# Patient Record
Sex: FEMALE | Race: AMERICAN INDIAN OR ALASKA NATIVE | NOT HISPANIC OR LATINO | Employment: FULL TIME | URBAN - METROPOLITAN AREA
[De-identification: names, ages, dates, MRNs, and addresses within clinical notes are randomized per-mention and may not be internally consistent; named-entity substitution may affect disease eponyms.]

---

## 2021-11-29 PROBLEM — H81.10 BPPV (BENIGN PAROXYSMAL POSITIONAL VERTIGO): Status: ACTIVE | Noted: 2021-11-29

## 2023-03-22 PROBLEM — H91.92 HIGH-FREQUENCY HEARING LOSS OF LEFT EAR: Status: ACTIVE | Noted: 2023-03-22

## 2023-06-01 PROBLEM — H91.92 HIGH-FREQUENCY HEARING LOSS OF LEFT EAR: Status: RESOLVED | Noted: 2023-03-22 | Resolved: 2023-06-01

## 2023-06-01 PROBLEM — Z01.10 ENCOUNTER FOR EXAMINATION OF HEARING WITHOUT ABNORMAL FINDINGS: Status: ACTIVE | Noted: 2023-06-01

## 2023-07-31 PROBLEM — H90.5 SENSORINEURAL HEARING LOSS (SNHL) OF LEFT EAR: Status: ACTIVE | Noted: 2023-07-31

## 2024-08-27 PROBLEM — N94.89 ENDOMETRIAL MASS: Status: ACTIVE | Noted: 2024-08-27

## 2024-08-27 PROBLEM — N93.8 DUB (DYSFUNCTIONAL UTERINE BLEEDING): Status: ACTIVE | Noted: 2024-08-27

## 2024-09-10 ENCOUNTER — APPOINTMENT (OUTPATIENT)
Dept: RADIOLOGY | Facility: MEDICAL CENTER | Age: 49
DRG: 064 | End: 2024-09-10
Attending: STUDENT IN AN ORGANIZED HEALTH CARE EDUCATION/TRAINING PROGRAM
Payer: COMMERCIAL

## 2024-09-10 ENCOUNTER — HOSPITAL ENCOUNTER (INPATIENT)
Facility: MEDICAL CENTER | Age: 49
LOS: 4 days | DRG: 064 | End: 2024-09-14
Attending: STUDENT IN AN ORGANIZED HEALTH CARE EDUCATION/TRAINING PROGRAM | Admitting: STUDENT IN AN ORGANIZED HEALTH CARE EDUCATION/TRAINING PROGRAM
Payer: COMMERCIAL

## 2024-09-10 DIAGNOSIS — R74.01 TRANSAMINITIS: ICD-10-CM

## 2024-09-10 DIAGNOSIS — R51.9 ACUTE NONINTRACTABLE HEADACHE, UNSPECIFIED HEADACHE TYPE: ICD-10-CM

## 2024-09-10 DIAGNOSIS — G08 DURAL VENOUS SINUS THROMBOSIS: ICD-10-CM

## 2024-09-10 DIAGNOSIS — M54.50 LUMBAR PAIN: ICD-10-CM

## 2024-09-10 DIAGNOSIS — M54.2 CERVICALGIA: ICD-10-CM

## 2024-09-10 PROBLEM — E87.6 HYPOKALEMIA: Status: ACTIVE | Noted: 2024-09-10

## 2024-09-10 PROBLEM — R47.01 EXPRESSIVE APHASIA: Status: ACTIVE | Noted: 2024-09-10

## 2024-09-10 PROBLEM — R74.8 ABNORMAL TRANSAMINASES: Status: ACTIVE | Noted: 2024-09-10

## 2024-09-10 LAB
ALBUMIN SERPL BCP-MCNC: 3.7 G/DL (ref 3.2–4.9)
ALBUMIN/GLOB SERPL: 1.1 G/DL
ALP SERPL-CCNC: 95 U/L (ref 30–99)
ALT SERPL-CCNC: 498 U/L (ref 2–50)
ANION GAP SERPL CALC-SCNC: 16 MMOL/L (ref 7–16)
APTT PPP: 21.5 SEC (ref 24.7–36)
AST SERPL-CCNC: 323 U/L (ref 12–45)
BASOPHILS # BLD AUTO: 0.4 % (ref 0–1.8)
BASOPHILS # BLD: 0.04 K/UL (ref 0–0.12)
BILIRUB SERPL-MCNC: 0.9 MG/DL (ref 0.1–1.5)
BUN SERPL-MCNC: 6 MG/DL (ref 8–22)
CALCIUM ALBUM COR SERPL-MCNC: 8.8 MG/DL (ref 8.5–10.5)
CALCIUM SERPL-MCNC: 8.6 MG/DL (ref 8.5–10.5)
CHLORIDE SERPL-SCNC: 98 MMOL/L (ref 96–112)
CO2 SERPL-SCNC: 20 MMOL/L (ref 20–33)
CREAT SERPL-MCNC: 0.52 MG/DL (ref 0.5–1.4)
EOSINOPHIL # BLD AUTO: 0.02 K/UL (ref 0–0.51)
EOSINOPHIL NFR BLD: 0.2 % (ref 0–6.9)
ERYTHROCYTE [DISTWIDTH] IN BLOOD BY AUTOMATED COUNT: 44.2 FL (ref 35.9–50)
GFR SERPLBLD CREATININE-BSD FMLA CKD-EPI: 113 ML/MIN/1.73 M 2
GLOBULIN SER CALC-MCNC: 3.3 G/DL (ref 1.9–3.5)
GLUCOSE SERPL-MCNC: 95 MG/DL (ref 65–99)
HCT VFR BLD AUTO: 40 % (ref 37–47)
HGB BLD-MCNC: 13.6 G/DL (ref 12–16)
IMM GRANULOCYTES # BLD AUTO: 0.04 K/UL (ref 0–0.11)
IMM GRANULOCYTES NFR BLD AUTO: 0.4 % (ref 0–0.9)
INR PPP: 1.06 (ref 0.87–1.13)
LYMPHOCYTES # BLD AUTO: 2.07 K/UL (ref 1–4.8)
LYMPHOCYTES NFR BLD: 20.5 % (ref 22–41)
MCH RBC QN AUTO: 29.8 PG (ref 27–33)
MCHC RBC AUTO-ENTMCNC: 34 G/DL (ref 32.2–35.5)
MCV RBC AUTO: 87.7 FL (ref 81.4–97.8)
MONOCYTES # BLD AUTO: 0.89 K/UL (ref 0–0.85)
MONOCYTES NFR BLD AUTO: 8.8 % (ref 0–13.4)
NEUTROPHILS # BLD AUTO: 7.05 K/UL (ref 1.82–7.42)
NEUTROPHILS NFR BLD: 69.7 % (ref 44–72)
NRBC # BLD AUTO: 0 K/UL
NRBC BLD-RTO: 0 /100 WBC (ref 0–0.2)
PLATELET # BLD AUTO: 200 K/UL (ref 164–446)
PMV BLD AUTO: 10.1 FL (ref 9–12.9)
POTASSIUM SERPL-SCNC: 3.4 MMOL/L (ref 3.6–5.5)
PROT SERPL-MCNC: 7 G/DL (ref 6–8.2)
PROTHROMBIN TIME: 13.9 SEC (ref 12–14.6)
RBC # BLD AUTO: 4.56 M/UL (ref 4.2–5.4)
SODIUM SERPL-SCNC: 134 MMOL/L (ref 135–145)
WBC # BLD AUTO: 10.1 K/UL (ref 4.8–10.8)

## 2024-09-10 PROCEDURE — 770020 HCHG ROOM/CARE - TELE (206)

## 2024-09-10 PROCEDURE — 83735 ASSAY OF MAGNESIUM: CPT

## 2024-09-10 PROCEDURE — 85025 COMPLETE CBC W/AUTO DIFF WBC: CPT

## 2024-09-10 PROCEDURE — 99223 1ST HOSP IP/OBS HIGH 75: CPT | Performed by: STUDENT IN AN ORGANIZED HEALTH CARE EDUCATION/TRAINING PROGRAM

## 2024-09-10 PROCEDURE — 36415 COLL VENOUS BLD VENIPUNCTURE: CPT

## 2024-09-10 PROCEDURE — 85730 THROMBOPLASTIN TIME PARTIAL: CPT

## 2024-09-10 PROCEDURE — 85610 PROTHROMBIN TIME: CPT

## 2024-09-10 PROCEDURE — 99285 EMERGENCY DEPT VISIT HI MDM: CPT

## 2024-09-10 PROCEDURE — 80053 COMPREHEN METABOLIC PANEL: CPT

## 2024-09-10 RX ORDER — PROCHLORPERAZINE EDISYLATE 5 MG/ML
5-10 INJECTION INTRAMUSCULAR; INTRAVENOUS EVERY 4 HOURS PRN
Status: DISCONTINUED | OUTPATIENT
Start: 2024-09-10 | End: 2024-09-14 | Stop reason: HOSPADM

## 2024-09-10 RX ORDER — ONDANSETRON 4 MG/1
4 TABLET, ORALLY DISINTEGRATING ORAL EVERY 4 HOURS PRN
Status: DISCONTINUED | OUTPATIENT
Start: 2024-09-10 | End: 2024-09-14 | Stop reason: HOSPADM

## 2024-09-10 RX ORDER — POTASSIUM CHLORIDE 1500 MG/1
40 TABLET, EXTENDED RELEASE ORAL EVERY 4 HOURS
Status: COMPLETED | OUTPATIENT
Start: 2024-09-10 | End: 2024-09-11

## 2024-09-10 RX ORDER — PROMETHAZINE HYDROCHLORIDE 25 MG/1
12.5-25 TABLET ORAL EVERY 4 HOURS PRN
Status: DISCONTINUED | OUTPATIENT
Start: 2024-09-10 | End: 2024-09-14 | Stop reason: HOSPADM

## 2024-09-10 RX ORDER — ACETAMINOPHEN 500 MG
1000 TABLET ORAL EVERY 6 HOURS PRN
Status: DISCONTINUED | OUTPATIENT
Start: 2024-09-16 | End: 2024-09-14 | Stop reason: HOSPADM

## 2024-09-10 RX ORDER — OXYCODONE HYDROCHLORIDE 10 MG/1
10 TABLET ORAL
Status: DISCONTINUED | OUTPATIENT
Start: 2024-09-10 | End: 2024-09-14 | Stop reason: HOSPADM

## 2024-09-10 RX ORDER — PROMETHAZINE HYDROCHLORIDE 25 MG/1
12.5-25 SUPPOSITORY RECTAL EVERY 4 HOURS PRN
Status: DISCONTINUED | OUTPATIENT
Start: 2024-09-10 | End: 2024-09-14 | Stop reason: HOSPADM

## 2024-09-10 RX ORDER — ONDANSETRON 2 MG/ML
4 INJECTION INTRAMUSCULAR; INTRAVENOUS EVERY 4 HOURS PRN
Status: DISCONTINUED | OUTPATIENT
Start: 2024-09-10 | End: 2024-09-14 | Stop reason: HOSPADM

## 2024-09-10 RX ORDER — OXYCODONE HYDROCHLORIDE 5 MG/1
5 TABLET ORAL
Status: DISCONTINUED | OUTPATIENT
Start: 2024-09-10 | End: 2024-09-14 | Stop reason: HOSPADM

## 2024-09-10 RX ORDER — HYDROMORPHONE HYDROCHLORIDE 1 MG/ML
0.5 INJECTION, SOLUTION INTRAMUSCULAR; INTRAVENOUS; SUBCUTANEOUS
Status: DISCONTINUED | OUTPATIENT
Start: 2024-09-10 | End: 2024-09-14 | Stop reason: HOSPADM

## 2024-09-10 RX ORDER — ACETAMINOPHEN 500 MG
1000 TABLET ORAL EVERY 6 HOURS
Status: DISCONTINUED | OUTPATIENT
Start: 2024-09-11 | End: 2024-09-14 | Stop reason: HOSPADM

## 2024-09-10 RX ORDER — DEXAMETHASONE SODIUM PHOSPHATE 4 MG/ML
10 INJECTION, SOLUTION INTRA-ARTICULAR; INTRALESIONAL; INTRAMUSCULAR; INTRAVENOUS; SOFT TISSUE ONCE
Status: COMPLETED | OUTPATIENT
Start: 2024-09-10 | End: 2024-09-11

## 2024-09-10 ASSESSMENT — PAIN DESCRIPTION - PAIN TYPE: TYPE: ACUTE PAIN

## 2024-09-10 ASSESSMENT — FIBROSIS 4 INDEX: FIB4 SCORE: 4.07

## 2024-09-10 ASSESSMENT — ENCOUNTER SYMPTOMS
VOMITING: 1
HEADACHES: 1

## 2024-09-11 LAB
APTT PPP: 26.4 SEC (ref 24.7–36)
HCG SERPL QL: NEGATIVE
INR PPP: 1.13 (ref 0.87–1.13)
MAGNESIUM SERPL-MCNC: 1.9 MG/DL (ref 1.5–2.5)
PROTHROMBIN TIME: 14.6 SEC (ref 12–14.6)
UFH PPP CHRO-ACNC: 0.41 IU/ML
UFH PPP CHRO-ACNC: 0.49 IU/ML
UFH PPP CHRO-ACNC: 0.81 IU/ML
UFH PPP CHRO-ACNC: <0.1 IU/ML

## 2024-09-11 PROCEDURE — 85730 THROMBOPLASTIN TIME PARTIAL: CPT

## 2024-09-11 PROCEDURE — 99233 SBSQ HOSP IP/OBS HIGH 50: CPT | Performed by: NURSE PRACTITIONER

## 2024-09-11 PROCEDURE — 70551 MRI BRAIN STEM W/O DYE: CPT

## 2024-09-11 PROCEDURE — 85520 HEPARIN ASSAY: CPT | Mod: 91

## 2024-09-11 PROCEDURE — 85306 CLOT INHIBIT PROT S FREE: CPT

## 2024-09-11 PROCEDURE — 36415 COLL VENOUS BLD VENIPUNCTURE: CPT

## 2024-09-11 PROCEDURE — 85303 CLOT INHIBIT PROT C ACTIVITY: CPT

## 2024-09-11 PROCEDURE — 84703 CHORIONIC GONADOTROPIN ASSAY: CPT

## 2024-09-11 PROCEDURE — 85613 RUSSELL VIPER VENOM DILUTED: CPT | Mod: 91

## 2024-09-11 PROCEDURE — 96366 THER/PROPH/DIAG IV INF ADDON: CPT

## 2024-09-11 PROCEDURE — 70544 MR ANGIOGRAPHY HEAD W/O DYE: CPT

## 2024-09-11 PROCEDURE — 85301 ANTITHROMBIN III ANTIGEN: CPT

## 2024-09-11 PROCEDURE — 96375 TX/PRO/DX INJ NEW DRUG ADDON: CPT

## 2024-09-11 PROCEDURE — 99222 1ST HOSP IP/OBS MODERATE 55: CPT

## 2024-09-11 PROCEDURE — 86147 CARDIOLIPIN ANTIBODY EA IG: CPT | Mod: 91

## 2024-09-11 PROCEDURE — 85610 PROTHROMBIN TIME: CPT

## 2024-09-11 PROCEDURE — 700102 HCHG RX REV CODE 250 W/ 637 OVERRIDE(OP): Performed by: STUDENT IN AN ORGANIZED HEALTH CARE EDUCATION/TRAINING PROGRAM

## 2024-09-11 PROCEDURE — 770020 HCHG ROOM/CARE - TELE (206)

## 2024-09-11 PROCEDURE — 85300 ANTITHROMBIN III ACTIVITY: CPT

## 2024-09-11 PROCEDURE — 96365 THER/PROPH/DIAG IV INF INIT: CPT

## 2024-09-11 PROCEDURE — 86146 BETA-2 GLYCOPROTEIN ANTIBODY: CPT | Mod: 91

## 2024-09-11 PROCEDURE — 700111 HCHG RX REV CODE 636 W/ 250 OVERRIDE (IP): Performed by: STUDENT IN AN ORGANIZED HEALTH CARE EDUCATION/TRAINING PROGRAM

## 2024-09-11 PROCEDURE — A9270 NON-COVERED ITEM OR SERVICE: HCPCS | Performed by: STUDENT IN AN ORGANIZED HEALTH CARE EDUCATION/TRAINING PROGRAM

## 2024-09-11 RX ORDER — HEPARIN SODIUM 1000 [USP'U]/ML
40 INJECTION, SOLUTION INTRAVENOUS; SUBCUTANEOUS PRN
Status: DISCONTINUED | OUTPATIENT
Start: 2024-09-11 | End: 2024-09-14

## 2024-09-11 RX ORDER — ACETAMINOPHEN 500 MG
1000 TABLET ORAL EVERY 4 HOURS PRN
COMMUNITY

## 2024-09-11 RX ORDER — CYANOCOBALAMIN 1000 UG/ML
1000 INJECTION, SOLUTION INTRAMUSCULAR; SUBCUTANEOUS
COMMUNITY

## 2024-09-11 RX ORDER — HEPARIN SODIUM 1000 [USP'U]/ML
80 INJECTION, SOLUTION INTRAVENOUS; SUBCUTANEOUS ONCE
Status: COMPLETED | OUTPATIENT
Start: 2024-09-11 | End: 2024-09-11

## 2024-09-11 RX ORDER — HEPARIN SODIUM 5000 [USP'U]/100ML
0-30 INJECTION, SOLUTION INTRAVENOUS CONTINUOUS
Status: DISCONTINUED | OUTPATIENT
Start: 2024-09-11 | End: 2024-09-14

## 2024-09-11 RX ADMIN — ACETAMINOPHEN 1000 MG: 500 TABLET ORAL at 05:12

## 2024-09-11 RX ADMIN — ACETAMINOPHEN 1000 MG: 500 TABLET ORAL at 19:44

## 2024-09-11 RX ADMIN — HEPARIN SODIUM 18 UNITS/KG/HR: 5000 INJECTION, SOLUTION INTRAVENOUS at 01:40

## 2024-09-11 RX ADMIN — DEXAMETHASONE SODIUM PHOSPHATE 10 MG: 4 INJECTION INTRA-ARTICULAR; INTRALESIONAL; INTRAMUSCULAR; INTRAVENOUS; SOFT TISSUE at 00:40

## 2024-09-11 RX ADMIN — ACETAMINOPHEN 1000 MG: 500 TABLET ORAL at 12:44

## 2024-09-11 RX ADMIN — HEPARIN SODIUM 4600 UNITS: 1000 INJECTION, SOLUTION INTRAVENOUS; SUBCUTANEOUS at 01:37

## 2024-09-11 RX ADMIN — ACETAMINOPHEN 1000 MG: 500 TABLET ORAL at 00:39

## 2024-09-11 RX ADMIN — POTASSIUM CHLORIDE 40 MEQ: 1500 TABLET, EXTENDED RELEASE ORAL at 00:39

## 2024-09-11 RX ADMIN — POTASSIUM CHLORIDE 40 MEQ: 1500 TABLET, EXTENDED RELEASE ORAL at 04:33

## 2024-09-11 SDOH — ECONOMIC STABILITY: TRANSPORTATION INSECURITY
IN THE PAST 12 MONTHS, HAS LACK OF RELIABLE TRANSPORTATION KEPT YOU FROM MEDICAL APPOINTMENTS, MEETINGS, WORK OR FROM GETTING THINGS NEEDED FOR DAILY LIVING?: NO

## 2024-09-11 SDOH — ECONOMIC STABILITY: TRANSPORTATION INSECURITY
IN THE PAST 12 MONTHS, HAS THE LACK OF TRANSPORTATION KEPT YOU FROM MEDICAL APPOINTMENTS OR FROM GETTING MEDICATIONS?: NO

## 2024-09-11 ASSESSMENT — PATIENT HEALTH QUESTIONNAIRE - PHQ9
4. FEELING TIRED OR HAVING LITTLE ENERGY: SEVERAL DAYS
1. LITTLE INTEREST OR PLEASURE IN DOING THINGS: NOT AT ALL
7. TROUBLE CONCENTRATING ON THINGS, SUCH AS READING THE NEWSPAPER OR WATCHING TELEVISION: NOT AT ALL
8. MOVING OR SPEAKING SO SLOWLY THAT OTHER PEOPLE COULD HAVE NOTICED. OR THE OPPOSITE, BEING SO FIGETY OR RESTLESS THAT YOU HAVE BEEN MOVING AROUND A LOT MORE THAN USUAL: NOT AT ALL
SUM OF ALL RESPONSES TO PHQ9 QUESTIONS 1 AND 2: 1
5. POOR APPETITE OR OVEREATING: SEVERAL DAYS
9. THOUGHTS THAT YOU WOULD BE BETTER OFF DEAD, OR OF HURTING YOURSELF: NOT AT ALL
2. FEELING DOWN, DEPRESSED, IRRITABLE, OR HOPELESS: SEVERAL DAYS
3. TROUBLE FALLING OR STAYING ASLEEP OR SLEEPING TOO MUCH: SEVERAL DAYS
SUM OF ALL RESPONSES TO PHQ QUESTIONS 1-9: 4
6. FEELING BAD ABOUT YOURSELF - OR THAT YOU ARE A FAILURE OR HAVE LET YOURSELF OR YOUR FAMILY DOWN: NOT AL ALL

## 2024-09-11 ASSESSMENT — COGNITIVE AND FUNCTIONAL STATUS - GENERAL
SUGGESTED CMS G CODE MODIFIER MOBILITY: CH
DAILY ACTIVITIY SCORE: 24
SUGGESTED CMS G CODE MODIFIER DAILY ACTIVITY: CH
MOBILITY SCORE: 24

## 2024-09-11 ASSESSMENT — LIFESTYLE VARIABLES
HAVE PEOPLE ANNOYED YOU BY CRITICIZING YOUR DRINKING: NO
TOTAL SCORE: 0
ALCOHOL_USE: NO
HOW MANY TIMES IN THE PAST YEAR HAVE YOU HAD 5 OR MORE DRINKS IN A DAY: 0
TOTAL SCORE: 0
ON A TYPICAL DAY WHEN YOU DRINK ALCOHOL HOW MANY DRINKS DO YOU HAVE: 0
DOES PATIENT WANT TO STOP DRINKING: NO
HAVE YOU EVER FELT YOU SHOULD CUT DOWN ON YOUR DRINKING: NO
TOTAL SCORE: 0
EVER HAD A DRINK FIRST THING IN THE MORNING TO STEADY YOUR NERVES TO GET RID OF A HANGOVER: NO
EVER FELT BAD OR GUILTY ABOUT YOUR DRINKING: NO
CONSUMPTION TOTAL: NEGATIVE
AVERAGE NUMBER OF DAYS PER WEEK YOU HAVE A DRINK CONTAINING ALCOHOL: 0

## 2024-09-11 ASSESSMENT — SOCIAL DETERMINANTS OF HEALTH (SDOH)

## 2024-09-11 ASSESSMENT — ENCOUNTER SYMPTOMS
WEAKNESS: 1
GASTROINTESTINAL NEGATIVE: 1
DIZZINESS: 1
HEADACHES: 1
MYALGIAS: 1
RESPIRATORY NEGATIVE: 1
PSYCHIATRIC NEGATIVE: 1
CHILLS: 0
FEVER: 0
EYES NEGATIVE: 1
CARDIOVASCULAR NEGATIVE: 1

## 2024-09-11 ASSESSMENT — FIBROSIS 4 INDEX: FIB4 SCORE: 3.62

## 2024-09-11 ASSESSMENT — PAIN DESCRIPTION - PAIN TYPE
TYPE: ACUTE PAIN

## 2024-09-11 NOTE — ED PROVIDER NOTES
ED Provider Note    CHIEF COMPLAINT  Chief Complaint   Patient presents with    Headache    Slurred Speech       EXTERNAL RECORDS REVIEWED  Reviewed imaging from outside facility.  Concerning for possible dural venous sinus thrombosis.  No large vessel infarct or hemorrhage.    HPI/ROS  LIMITATION TO HISTORY   None  OUTSIDE HISTORIAN(S):  Discussed with Dr. Turner, provider at [facility] Stefanie Marcelo is a 48 y.o. female who presents for evaluation of possible venous sinus thrombosis.  Patient has had headaches for months.  She was seen on 4 September for vomiting, neck pain and headache where she had a normal CT head and C-spine.  She presented again today for a constant, mild nonradiating headache.  Today she had developed some speech difficulties she said that she had difficulty forming words and felt that it was more slurred.  She is able to comprehend words and answer yes/no questions without difficulty.  She denies any new visual changes, no diplopia or double vision.  No neck pain.  No fevers no head trauma.    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    SURGICAL HISTORY   has a past surgical history that includes tubal coagulation laparoscopic bilateral (1999).    FAMILY HISTORY  History reviewed. No pertinent family history.    SOCIAL HISTORY  Social History     Tobacco Use    Smoking status: Former    Smokeless tobacco: Never   Vaping Use    Vaping status: Never Used   Substance and Sexual Activity    Alcohol use: No    Drug use: No    Sexual activity: Yes     Partners: Male     Birth control/protection: Female Sterilization       CURRENT MEDICATIONS  Home Medications    **Home medications have not yet been reviewed for this encounter**       Audit from Redirected Encounters    **Home medications have not yet  "been reviewed for this encounter**         ALLERGIES  Allergies   Allergen Reactions    Amoxicillin Swelling     Swelling lips, blisters in mouth, hives    Apple Hives     Oral swelling, hives    Aspirin Swelling     TOLD NOT TO TAKE DUE TO NSAID ALLERGY    Eggplant Hives     Blisters in throat    Ibuprofen Hives and Swelling    Shrimp (Diagnostic) Hives and Swelling    Tomato Hives and Swelling    [place] Hives and Swelling     Per pt, she can eat other tree nuts like almonds and also tolerates peanuts    Milk-Related Compounds      GI symptoms       PHYSICAL EXAM  VITAL SIGNS: /54   Pulse (!) 57   Temp 36.8 °C (98.3 °F) (Oral)   Resp 18   Ht 1.549 m (5' 1\")   Wt 73 kg (161 lb)   LMP 08/12/2024 (Exact Date) Comment: tubal ligation 1999  SpO2 96%   BMI 30.42 kg/m²    Constitutional: Awake and alert . Non toxic  HENT: Normal inspection    Eyes: Normal inspection  Neck: Grossly normal range of motion.  Cardiovascular: Normal heart rate, Normal rhythm.  Symmetric peripheral pulses.   Thorax & Lungs: No respiratory distress, No wheezing, No rales, No rhonchi  Abdomen: Soft, non-distended, nontender to palpation in all 4 quadrants, no mass  Skin: No obvious rash.  Extremities: Warm, well perfused. No clubbing, cyanosis, edema   Neurologic: She is A&Ox 4.  She has no facial droop.  She has no pronator drift.  He has full strength to upper and lower extremities.  Grossly normal sensation to light touch.  She does have difficulty forming words with expressive aphasia.  Grossly normal coordination.  Psychiatric: Normal for situation      EKG/LABS  Results for orders placed or performed during the hospital encounter of 09/10/24   CBC WITH DIFFERENTIAL   Result Value Ref Range    WBC 10.1 4.8 - 10.8 K/uL    RBC 4.56 4.20 - 5.40 M/uL    Hemoglobin 13.6 12.0 - 16.0 g/dL    Hematocrit 40.0 37.0 - 47.0 %    MCV 87.7 81.4 - 97.8 fL    MCH 29.8 27.0 - 33.0 pg    MCHC 34.0 32.2 - 35.5 g/dL    RDW 44.2 35.9 - 50.0 fL    " Platelet Count 200 164 - 446 K/uL    MPV 10.1 9.0 - 12.9 fL    Neutrophils-Polys 69.70 44.00 - 72.00 %    Lymphocytes 20.50 (L) 22.00 - 41.00 %    Monocytes 8.80 0.00 - 13.40 %    Eosinophils 0.20 0.00 - 6.90 %    Basophils 0.40 0.00 - 1.80 %    Immature Granulocytes 0.40 0.00 - 0.90 %    Nucleated RBC 0.00 0.00 - 0.20 /100 WBC    Neutrophils (Absolute) 7.05 1.82 - 7.42 K/uL    Lymphs (Absolute) 2.07 1.00 - 4.80 K/uL    Monos (Absolute) 0.89 (H) 0.00 - 0.85 K/uL    Eos (Absolute) 0.02 0.00 - 0.51 K/uL    Baso (Absolute) 0.04 0.00 - 0.12 K/uL    Immature Granulocytes (abs) 0.04 0.00 - 0.11 K/uL    NRBC (Absolute) 0.00 K/uL   COMP METABOLIC PANEL   Result Value Ref Range    Sodium 134 (L) 135 - 145 mmol/L    Potassium 3.4 (L) 3.6 - 5.5 mmol/L    Chloride 98 96 - 112 mmol/L    Co2 20 20 - 33 mmol/L    Anion Gap 16.0 7.0 - 16.0    Glucose 95 65 - 99 mg/dL    Bun 6 (L) 8 - 22 mg/dL    Creatinine 0.52 0.50 - 1.40 mg/dL    Calcium 8.6 8.5 - 10.5 mg/dL    Correct Calcium 8.8 8.5 - 10.5 mg/dL    AST(SGOT) 323 (H) 12 - 45 U/L    ALT(SGPT) 498 (H) 2 - 50 U/L    Alkaline Phosphatase 95 30 - 99 U/L    Total Bilirubin 0.9 0.1 - 1.5 mg/dL    Albumin 3.7 3.2 - 4.9 g/dL    Total Protein 7.0 6.0 - 8.2 g/dL    Globulin 3.3 1.9 - 3.5 g/dL    A-G Ratio 1.1 g/dL   APTT   Result Value Ref Range    APTT 21.5 (L) 24.7 - 36.0 sec   PROTHROMBIN TIME (INR)   Result Value Ref Range    PT 13.9 12.0 - 14.6 sec    INR 1.06 0.87 - 1.13   ESTIMATED GFR   Result Value Ref Range    GFR (CKD-EPI) 113 >60 mL/min/1.73 m 2         RADIOLOGY/PROCEDURES     Radiologist interpretation:  MR-VENOGRAM (MRV) HEAD    (Results Pending)   MR-BRAIN-W/O    (Results Pending)       COURSE & MEDICAL DECISION MAKING    ASSESSMENT, COURSE AND PLAN  Care Narrative: This is a 48-year-old female who was transferred due to concern for venous sinus thrombosis.  She has had chronic headaches but today had an episode of word finding difficulty.  On arrival here she has normal  vital signs.  She does have some word finding difficulty but otherwise no focal deficits. NIHSS 2.  Patient already had cross-sectional imaging with concern for venous sinus thrombosis but no findings of an acute CVA or hemorrhage.  I have ordered an MRV for further characterization.  After discussion with the previous ER doctor and his conversation with neurology decision made to hold on heparinization until MRI and therefore patient has not been anticoagulated.  Of note she does have a transaminitis of unclear significance otherwise her labs are unremarkable.  Discussed with Dr. Louis who will admit for further management.  Neurology consultation will be obtained as an inpatient, as no acute CVA no indication for emergent consultation at this time.            DISPOSITION AND DISCUSSIONS  I have discussed management of the patient with the following physicians and RORO's:  Dr. Louis      FINAL DIAGNOSIS  1. Acute nonintractable headache, unspecified headache type Acute   2. Transaminitis Acute        Electronically signed by: Dionne Delvalle M.D., 9/10/2024 10:27 PM

## 2024-09-11 NOTE — CARE PLAN
The patient is Stable - Low risk of patient condition declining or worsening    Shift Goals  Clinical Goals: stable/improved neuro intact  Patient Goals: ambulation  Family Goals: updates    Progress made toward(s) clinical / shift goals:    Problem: Knowledge Deficit - Standard  Goal: Patient and family/care givers will demonstrate understanding of plan of care, disease process/condition, diagnostic tests and medications  Outcome: Progressing     Problem: Psychosocial  Goal: Patient's level of anxiety will decrease  Outcome: Progressing  Goal: Patient's ability to verbalize feelings about condition will improve  Outcome: Progressing     Problem: Communication  Goal: The ability to communicate needs accurately and effectively will improve  Outcome: Progressing     Problem: Discharge Barriers/Planning  Goal: Patient's continuum of care needs are met  Outcome: Progressing     Problem: Hemodynamics  Goal: Patient's hemodynamics, fluid balance and neurologic status will be stable or improve  Outcome: Progressing     Problem: Dysphagia  Goal: Dysphagia will improve  Outcome: Progressing     Problem: Nutrition  Goal: Patient's nutritional and fluid intake will be adequate or improve  Outcome: Progressing     Problem: Mobility  Goal: Patient's capacity to carry out activities will improve  Outcome: Progressing     Problem: Infection - Standard  Goal: Patient will remain free from infection  Outcome: Progressing       Patient is not progressing towards the following goals:

## 2024-09-11 NOTE — ASSESSMENT & PLAN NOTE
CT at outside facility showed no stroke with possible cavernous sinus thrombosis.   MRI confirms dural sinus thrombosis  Continue heparin drip  Neurochecks every 4 hours  Neurology Dr. Monreal following  STAT CT head if patient shows acute neurologic deterioration or obtundation

## 2024-09-11 NOTE — PROGRESS NOTES
4 Eyes Skin Assessment Completed by BRIGIDO Piper and BRIGIDO Richards.    Head WDL  Ears WDL  Nose WDL  Mouth WDL  Neck WDL  Breast/Chest WDL  Shoulder Blades WDL  Spine WDL  (R) Arm/Elbow/Hand WDL  (L) Arm/Elbow/Hand WDL  Abdomen WDL  Groin WDL  Scrotum/Coccyx/Buttocks WDL  (R) Leg WDL  (L) Leg WDL  (R) Heel/Foot/Toe WDL  (L) Heel/Foot/Toe WDL          Devices In Places Tele Box, Blood Pressure Cuff, Pulse Ox, and SCD's      Interventions In Place Pillows, Heels Loaded W/Pillows, and Pressure Redistribution Mattress    Possible Skin Injury No    Pictures Uploaded Into Epic N/A  Wound Consult Placed N/A  RN Wound Prevention Protocol Ordered No

## 2024-09-11 NOTE — ED NOTES
Pt reports she is feeling better and that is becoming easier & easier to speak. Pt speech clear, and able to recall words quickly. Reports she still feels slightly slow compared to baseline.

## 2024-09-11 NOTE — ED NOTES
Bedside report received from off going RN/tech: BRIGIDO Swenson assumed care of patient.  POC discussed with patient. Call light within reach, all needs addressed at this time.       Fall risk interventions in place: Give patient urinal if applicable and Keep floor surfaces clean and dry (all applicable per [place] Fall risk assessment)   Continuous monitoring: Cardiac Leads, Pulse Ox, or Blood Pressure  IVF/IV medications: Not Applicable   Oxygen: Room Air  Bedside sitter: Not Applicable   Isolation: Not Applicable

## 2024-09-11 NOTE — ASSESSMENT & PLAN NOTE
CT at outside facility showed no stroke with possible cavernous sinus thrombosis.   Resolved  MRI shows venous sinus thrombosis, no acute stroke  Neurology following  See recommendations

## 2024-09-11 NOTE — PROGRESS NOTES
Hospital Medicine Daily Progress Note    Date of Service  9/11/2024    Chief Complaint  Rama Marcelo is a 48 y.o. female admitted 9/10/2024 with severe and worsening headaches    Hospital Course  Ms. Rama Marcelo is a 48 y.o. female who presented 9/10/2024 as a transfer from outside facility with concerns of cavernous sinus thrombosis.      Patient reports having headache progressively worsening over the last 2 weeks.  Reports the headache started in the front of her head behind her eyes and traveled to the back of her head down her neck and front of her neck.  Reports associated vomiting.  Reports today this morning she had difficulty finding her words.  Denies any fevers or chills.  CT imaging at outside facility showed no stroke but possible cavernous venous thrombosis.  Neuro on board and recommended MRV imaging and confirming diagnosis before initiating anticoagulation.      During this hospitalization, neurology Dr. Monreal was consulted.  Notable MRV revealed extensive sinus venous thrombosis of the right jugular vein, right sigmoid, transverse and superior sagittal sinus.  Patient initiated on hypercoagulable panel on heparin drip and will continue for 3-4 days prior to transitioning to oral DOAC and apixaban.  MRI brain demonstrate left parietal venous infarct with petechial hemorrhage however anticoagulation remains treatment for further development of venous infarcts and hemorrhage.  Patient will be monitored inpatient while on heparin drip and will be discharged to follow-up with outpatient stroke [facility] and repeat MRI in approximately 6 months.    Interval Problem Update  -Patient seen and examined.  Patient reports headache is still lingering, however it has significantly improved.  Discussed with patient recommendations from neurology of which she is agreeable at this time.  -Plan of care: Continue heparin drip 3-4 days prior to transitioning to oral DOAC and apixaban; monitor  headache and treat as indicated; monitor for any signs of bleed and do neurochecks as indicated.  -Disposition: Patient currently patient status as she is anticipated to stay 2-3 midnights for management of venous sinus thrombosis.  -Lab work: Reviewed; expected  -VSS at this time    I have discussed this patient's plan of care and discharge plan at IDT rounds today with Case Management, Nursing, Nursing leadership, and other members of the IDT team.    Consultants/Specialty  neurology    Code Status  Full Code    Disposition  The patient is not medically cleared for discharge to home or a post-acute facility.  Anticipate discharge to: home with close outpatient follow-up    I have placed the appropriate orders for post-discharge needs.    Review of Systems  Review of Systems   Constitutional:  Positive for malaise/fatigue. Negative for chills and fever.   HENT: Negative.     Eyes: Negative.    Respiratory: Negative.     Cardiovascular: Negative.    Gastrointestinal: Negative.    Genitourinary: Negative.    Musculoskeletal:  Positive for myalgias.   Skin: Negative.    Neurological:  Positive for dizziness, weakness and headaches.   Endo/Heme/Allergies: Negative.    Psychiatric/Behavioral: Negative.          Physical Exam  Temp:  [36.5 °C (97.7 °F)-36.9 °C (98.4 °F)] 36.5 °C (97.7 °F)  Pulse:  [51-88] 64  Resp:  [16-21] 18  BP: (107-156)/(54-89) 122/70  SpO2:  [92 %-98 %] 97 %    Physical Exam  Vitals and nursing note reviewed.   Constitutional:       Appearance: She is obese.   HENT:      Head: Normocephalic.      Nose: Nose normal.      Mouth/Throat:      Mouth: Mucous membranes are moist.      Pharynx: Oropharynx is clear.   Eyes:      Pupils: Pupils are equal, round, and reactive to light.   Cardiovascular:      Rate and Rhythm: Normal rate and regular rhythm.      Pulses: Normal pulses.      Heart sounds: Normal heart sounds.   Pulmonary:      Effort: Pulmonary effort is normal.      Breath sounds: Normal breath  sounds.   Abdominal:      General: Bowel sounds are normal.   Musculoskeletal:         General: Tenderness present.      Cervical back: Normal range of motion and neck supple.   Skin:     General: Skin is dry.   Neurological:      Mental Status: She is alert. Mental status is at baseline.      Motor: Weakness present.         Fluids  No intake or output data in the 24 hours ending 09/11/24 1210     Laboratory  Recent Labs     09/10/24  1900 09/10/24  2201   WBC 9.1 10.1   RBC 4.94 4.56   HEMOGLOBIN 14.7 13.6   HEMATOCRIT 42.5 40.0   MCV 86.0 87.7   MCH 29.8 29.8   MCHC 34.6 34.0   RDW 13.6 44.2   PLATELETCT 225 200   MPV 10.2 10.1     Recent Labs     09/10/24  1900 09/10/24  2201   SODIUM 134* 134*   POTASSIUM 3.2* 3.4*   CHLORIDE 104 98   CO2 21* 20   GLUCOSE 112* 95   BUN 7 6*   CREATININE 0.6 0.52   CALCIUM 9.2 8.6     Recent Labs     09/10/24  1900 09/10/24  2201 09/11/24  0123   APTT 25.6 21.5* 26.4   INR 0.99 1.06 1.13               Imaging  MR-BRAIN-W/O   Final Result      1.  Dural sinus thrombosis involving the right internal jugular vein, sigmoid sinus, transverse sinus and sagittal sinus.   2.  Thrombosis of a left posterior cerebral vein overlying the parietal cortex with associated venous infarct.      Attempts to convey these findings to Dr. Louis were initiated on 9/11/2024 12:16 AM. Findings were conveyed to Dr. Louis on 9/11/2024 12:26 AM.      MR-VENOGRAM (MRV) HEAD   Final Result         1.  Occlusive dural venous sinus thrombosis involving the sagittal sinus, right transverse and sigmoid sinuses and proximal right jugular vein. Additionally, there appears to be thrombosis of a left posterior superior cerebral vein overlying the parietal    lobe.   2.  Left transverse and sigmoid sinuses and left jugular vein appear patent.      Attempts to convey these findings to Dr. Louis were initiated on 9/11/2024 12:16 AM. Findings were conveyed to Dr. Louis on 9/11/2024 12:26 AM.            Assessment/Plan  * Acute intractable headache  Assessment & Plan  CT at outside facility showed no stroke with possible cavernous sinus thrombosis.  MRV ordered.  Supportive care with pain control until MRV confirms diagnosis of venous sinus thrombosis.  Will initiate therapeutic anticoagulation if diagnosis made on MRV and confirmed.  Neurochecks every 4 hours  Neurology Dr. Monreal following  Start patient on heparin drip for 3-4 days and will transition to oral DOAC and apixaban  Monitor for any signs of bleed    Hypokalemia  Assessment & Plan  Replete   Check magnesium     Abnormal transaminases  Assessment & Plan  Chronic       Expressive aphasia- (present on admission)  Assessment & Plan  CT at outside facility showed no stroke with possible cavernous sinus thrombosis.  MRV ordered.  Supportive care with pain control until MRV confirms diagnosis of venous sinus thrombosis.  Will initiate therapeutic anticoagulation if diagnosis made on MRV and confirmed.  Neurochecks every 4 hours  Neurology following  See recommendations         VTE prophylaxis:    therapeutic anticoagulation with Heparin gtt      I have performed a physical exam and reviewed and updated ROS and Plan today (9/11/2024). In review of yesterday's note (9/10/2024), there are no changes except as documented above.      Please note that this dictation was created using voice recognition software. I have made every reasonable attempt to correct obvious errors, but there may be errors of grammar and possibly content that I did not discover before finalizing the note.    Electronically signed by:  Dr. DARON Slater, DNP, APRN, FNP-C  Hospitalist Services  [facility]  [phone]  [email or web address]  09/11/24                 0046

## 2024-09-11 NOTE — ED TRIAGE NOTES
"Chief Complaint   Patient presents with    Headache    Slurred Speech     Pt arrives with complaint of headache x 2 weeks and expressive aphasia since 0800. Pt was seen at [place] where she was diagnosed with cavernus sinus thrombosis. Pt sent for neurology consult and MRI. Pt arrives aox4, skin warm and dry, airway patent, rr even and unlabored, moves all extremities upon command. NIH 2 due to mild aphasia and dysarthria.     /67   Pulse 60   Temp 36.8 °C (98.3 °F) (Oral)   Resp 18   Ht 1.549 m (5' 1\")   Wt 73 kg (161 lb)   LMP 08/12/2024 (Exact Date) Comment: tubal ligation 1999  SpO2 97%   BMI 30.42 kg/m²     "

## 2024-09-11 NOTE — ED NOTES
Bedside report received from off going RN: Digna, assumed care of patient.  POC discussed with patient. Call light within reach, all needs addressed at this time. Pt just ambulated back from restroom. Pt eating meal tray.       Fall risk interventions in place: Patient's personal possessions are with in their safe reach and Keep floor surfaces clean and dry (all applicable per [employer] Fall risk assessment)   Continuous monitoring: Cardiac Leads, Pulse Ox, or Blood Pressure  IVF/IV medications: Infusion per MAR (List Med(s)) Heparin 18 units/kg/hr.   Oxygen: Room Air  Bedside sitter: n/a  Isolation: Not Applicable

## 2024-09-11 NOTE — PROGRESS NOTES
Stroke neuro brief note    48F no significant medical history, on OCP, presenting with headache and slurred speech, transferred from OSH for further care.  On arrival, MRI brain, MRI venogram revealing extensive sinus venous thrombosis of the R jugular vein, R sigmoid, transverse, and superior sagittal sinus.  In addition, there is cortical vein thrombosis of a L parietal vein with associated venous infarct.  There is no hemorrhage.    Recommendations:   - will draw hypercoagulable workup now:  Factor II, Factor V Leiden. ATIII assay, antiphospholipid panel   - start heparin bolus protocol for therapeutic anticoagulation   - formal stroke consult in AM    Sergio Monreal MD  Stroke Neurology

## 2024-09-11 NOTE — CONSULTS
Vascular Neurology Initial Consult H&P  Neurovascular Service, [facility]    Referring Physician: Lucille Wilson*    Chief Complaint   Patient presents with    Headache    Slurred Speech       HPI: Rama Marcelo is a 48 y.o. female without significant PMHX, on OCP, presenting to OSH with 2 weeks H.A and 1 day slurred speech. NCCTH at OSH suggestive of L cortical venous thrombosis and posterior superior sagittal sinus. On arrival to [facility] MRI brain, MRV revealed extensive sinus venous thrombosis of the R jugular vein, R sigmoid, transverse, and superior sagittal sinus. Additionally, there is cortical vein thrombosis of a L parietal vein with associated venous infarct. Neurology consulted for evaluation of above.         Patient seen in the ER, confirms she has had 2 weeks of H.A with insidious onset, not positional. Reports she had associated N&V. Yesterday morning, she developed acute onset speech difficulties. Currently H.A 3/10, no nausea. Speech has returned to baseline. Denies any history of blood clots, family hemophilia disorders. Reports she has had 2 miscarriages in the past. Discussed needing to stop OCP, reports she was planned for possible hysterectomy October 3 for fibroids.           Review of systems: In addition to what is detailed in the HPI above, all other systems reviewed and are negative.    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    She has no past medical history of Acute nasopharyngitis, Addisons disease (HCC), Adrenal disorder (HCC), Allergy, Anemia, Anesthesia, Anginal syndrome (HCC), Anxiety, Arrhythmia, Arthritis, Asthma, At risk for sleep apnea, Blood clotting disorder ([facility]), Bowel habit changes, Breath shortness, Bronchitis, Cancer ([facility]), Carcinoma in  situ of respiratory system, Cataract, CHF (congestive heart failure) (HCC), Clotting disorder (HCC), Continuous ambulatory peritoneal dialysis status (HCC), COPD (chronic obstructive pulmonary disease) (HCC), Coughing blood, Cushings syndrome (HCC), Dental disorder, Depression, Diabetes (HCC), Diabetic neuropathy (HCC), Dialysis patient (HCC), GERD (gastroesophageal reflux disease), Glaucoma, Goiter, Heart attack (HCC), Heart burn, Heart murmur, Heart valve disease, Hemorrhagic disorder (HCC), Hepatitis A, Hepatitis B, Hepatitis C, Hiatus hernia syndrome, High cholesterol, HIV (human immunodeficiency virus infection) (HCC), Hyperlipidemia, Hypertension, IBD (inflammatory bowel disease), Indigestion, Infectious disease, Jaundice, Kidney disease, Meningitis, Migraine, Muscle disorder, Osteoporosis, Pacemaker, Parathyroid disorder (HCC), Pituitary disease (HCC), Pneumonia, Pregnant, Psychiatric problem, Pulmonary emphysema (HCC), Rheumatic fever, Seizure (HCC), Sickle cell disease (HCC), Sleep apnea, Snoring, Stroke ([facility]), Substance abuse ([facility]), Thyroid disease, Tuberculosis, Urinary bladder disorder, or Urinary tract infection.    FHx:  family history is not on file.    SHx:   reports that she has quit smoking. She has never used smokeless tobacco. She reports that she does not drink alcohol and does not use drugs.    Allergies:  Allergies   Allergen Reactions    Amoxicillin Swelling     Swelling lips, blisters in mouth, hives    Apple Hives     Oral swelling, hives    Aspirin Swelling     TOLD NOT TO TAKE DUE TO NSAID ALLERGY    Eggplant Hives     Blisters in throat    Ibuprofen Hives and Swelling    Shrimp (Diagnostic) Hives and Swelling    Tomato Hives and Swelling    [place] Hives and Swelling     Per pt, she can eat other tree nuts like almonds and also tolerates peanuts    Milk-Related Compounds      GI symptoms       Medications:    Current Facility-Administered Medications:     heparin infusion 25,000 units in  500 mL 0.45% NACL, 0-30 Units/kg/hr (Adjusted), Intravenous, Continuous, Johnson Louis M.D., Last Rate: 20.8 mL/hr at 09/11/24 0442, 18 Units/kg/hr at 09/11/24 0442    heparin injection 2,300 Units, 40 Units/kg (Adjusted), Intravenous, PRN, Johnson Louis M.D.    ondansetron (Zofran) syringe/vial injection 4 mg, 4 mg, Intravenous, Q4HRS PRN, Johnson Louis M.D.    ondansetron (Zofran ODT) dispertab 4 mg, 4 mg, Oral, Q4HRS PRN, Johnson Louis M.D.    promethazine (Phenergan) tablet 12.5-25 mg, 12.5-25 mg, Oral, Q4HRS PRN, Johnson Louis M.D.    promethazine (Phenergan) suppository 12.5-25 mg, 12.5-25 mg, Rectal, Q4HRS PRN, Johnson Louis M.D.    prochlorperazine (Compazine) injection 5-10 mg, 5-10 mg, Intravenous, Q4HRS PRN, Johnson Louis M.D.    Pharmacy Consult Request ...Pain Management Review 1 Each, 1 Each, Other, PHARMACY TO DOSE, Johnson Louis M.D.    acetaminophen (Tylenol) tablet 1,000 mg, 1,000 mg, Oral, Q6HRS, 1,000 mg at 09/11/24 0512 **FOLLOWED BY** [START ON 9/16/2024] acetaminophen (Tylenol) tablet 1,000 mg, 1,000 mg, Oral, Q6HRS PRN, Johnson Loius M.D.    oxyCODONE immediate-release (Roxicodone) tablet 5 mg, 5 mg, Oral, Q3HRS PRN **OR** oxyCODONE immediate release (Roxicodone) tablet 10 mg, 10 mg, Oral, Q3HRS PRN **OR** HYDROmorphone (Dilaudid) injection 0.5 mg, 0.5 mg, Intravenous, Q3HRS PRN, Johnson Louis M.D.    Current Outpatient Medications:     cyanocobalamin (VITAMIN B-12) 1000 MCG/ML Solution, Inject 1,000 mcg into the shoulder, thigh, or buttocks every 30 days. [facility], Disp: , Rfl:     acetaminophen (TYLENOL) 500 MG Tab, Take 1,000 mg by mouth every four hours as needed. Indications: Pain, Disp: , Rfl:     Ascorbic Acid (VITAMIN C PO), Take 1 Tablet by mouth every Monday, Wednesday, and Friday., Disp: , Rfl:     VITAMINS A C PO, Take 1 Tablet by mouth every day., Disp: , Rfl:     Cholecalciferol (VITAMIN D3) 2000 UNIT Cap, Take 1 Capsule by mouth every  "day., Disp: , Rfl:     ferrous gluconate (FERGON) 324 (38 Fe) MG Tab, Take 648 mg by mouth every Monday, Wednesday, and Friday., Disp: , Rfl:     Norethin Ace-Eth Estrad-FE (MICROGESTIN 24 FE) 1-20 MG-MCG(24) Tab, Take 1 Tablet by mouth every day., Disp: 84 Tablet, Rfl: 4    Prenatal 27-1 MG Tab, Take 1 Tablet by mouth every day., Disp: , Rfl:     ondansetron (ZOFRAN ODT) 4 MG TABLET DISPERSIBLE, Take 1 Tablet by mouth every 6 hours as needed for Nausea/Vomiting. (Patient not taking: Reported on 9/11/2024), Disp: 10 Tablet, Rfl: 0    Physical Examination:    /75   Pulse (!) 58   Temp 36.8 °C (98.3 °F) (Oral)   Resp 18   Ht 1.549 m (5' 1\")   Wt 73 kg (161 lb)   LMP 08/12/2024 (Exact Date) Comment: tubal ligation 1999  SpO2 95%   BMI 30.42 kg/m²       General: Patient is awake and in no acute distress  Eye: Examination of optic disks not indicated at this time given acuity of consult  Neck: There is normal range of motion  CV: regular rate   Extremities:  clear, dry, intact, without peripheral edema    NEUROLOGICAL EXAM:   Mental status: Awake, alert and fully oriented  Speech and language: Naming and repetition intact, fluent speech, follows simple, two-step, multi-step and complex commands.  CRANIAL NERVES:  II: Pupils equal and reactive, no VF deficits  III, IV, VI: EOM intact, no gaze preference or deviation, no nystagmus.  V: normal sensation in V1, V2, and V3 segments bilaterally  VII: no asymmetry, no nasolabial fold flattening  VIII: normal hearing to conversation  IX, X: normal palatal elevation, no uvular deviation  XI: 5/5 head turn and 5/5 shoulder shrug bilaterally  XII: midline tongue protrusion      Motor exam: There is sustained antigravity with no downward drift in bilateral arms and legs.  There is no pronator drift. Tone is normal. No abnormal movements were seen on exam.    RUE 5/5   LUE 5/5   RLL 5/5   LLL 5/5        Sensory exam: Reacts to tactile in all 4 extremities, no neglect to " double stim   Deep tendon reflexes:  2+ throughout. Toes down-going bilaterally.  Coordination: No ataxia on bilateral finger-to-nose testing  Gait: Deferred         NIHSS: National Institutes of Health Stroke Scale    [0] 1a:Level of Consciousness    0-alert 1-drowsy   2-stupor   3-coma  [0] 1b:LOC Questions                  0-both  1-one      2-neither  [0] 1c:LOC Commands                   0-both  1-one      2-neither  [0] 2: Best Gaze                     0-nl    1-partial  2-forced  [0] 3: Visual Fields                   0-nl    1-partial  2-complete 3-bilat  [0] 4: Facial Paresis                0-nl    1-minor    2-partial  3-full  MOTOR                       0-nl  [0] 5: Right Arm           1-drift  [0] 6: Left Arm             2-some effort vs gravity  [0] 7: Right Leg           3-no effort vs gravity  [0] 8: Left Leg             4-no movement                             x-untestable  [0] 9: Limb Ataxia                    0-abs   1-1_limb   2-2+_limbs       x-untestable  [0] 10:Sensory                        0-nl    1-partial  2-dense  [0] 11:Best Language/Aphasia         0-nl    1-mild/mod 2-severe   3-mute  [0] 12:Dysarthria                     0-nl    1-mild/mod 2-severe       x-untestable  [0] 13:Neglect/Inattention            0-none  1-partial  2-complete  [0] TOTAL        Baseline Modified Boyle Scale (MRS): 0 = No symptoms    Objective Data:    Labs:  Estimated Creatinine Clearance: 118.3 mL/min (by C-G formula based on SCr of 0.52 mg/dL).  Recent Labs     09/10/24  1900 09/10/24  2201   SODIUM 134* 134*   POTASSIUM 3.2* 3.4*   CHLORIDE 104 98   CO2 21* 20   GLUCOSE 112* 95   BUN 7 6*   CREATININE 0.6 0.52     Recent Labs     09/10/24  1900 09/10/24  2201   GLUCOSE 112* 95     Recent Labs     09/10/24  1900 09/10/24  2201   ASTSGOT 443* 323*   ALTSGPT 584* 498*   ALKPHOSPHAT 108 95     Recent Labs     09/10/24  1900 09/10/24  2201   WBC 9.1 10.1   HEMOGLOBIN 14.7 13.6   PLATELETCT 225 200     Lab  "Results   Component Value Date/Time    PROTHROMBTM 14.6 09/11/2024 01:23 AM    INR 1.13 09/11/2024 01:23 AM      No results found for: \"TROPONINT\", \"NTPROBNP\"  No results found for: \"HBA1C\"  No results found for: \"LDL\", \"HDL\", \"TRIGLYCERIDE\", \"CHOLSTRLTOT\"    Imaging/Testing:    I interpreted and/or reviewed the patient's neuroimaging    MR-BRAIN-W/O   Final Result      1.  Dural sinus thrombosis involving the right internal jugular vein, sigmoid sinus, transverse sinus and sagittal sinus.   2.  Thrombosis of a left posterior cerebral vein overlying the parietal cortex with associated venous infarct.      Attempts to convey these findings to Dr. Louis were initiated on 9/11/2024 12:16 AM. Findings were conveyed to Dr. Louis on 9/11/2024 12:26 AM.      MR-VENOGRAM (MRV) HEAD   Final Result         1.  Occlusive dural venous sinus thrombosis involving the sagittal sinus, right transverse and sigmoid sinuses and proximal right jugular vein. Additionally, there appears to be thrombosis of a left posterior superior cerebral vein overlying the parietal    lobe.   2.  Left transverse and sigmoid sinuses and left jugular vein appear patent.      Attempts to convey these findings to Dr. Louis were initiated on 9/11/2024 12:16 AM. Findings were conveyed to Dr. Louis on 9/11/2024 12:26 AM.          Assessment and Plan:    Rama Marcelo is a 48 y.o. female transferred from OSH for evaluation of sinus thromboses. MRI brain and MRV on arrival revealed extensive sinus venous thrombosis of the R jugular vein, R sigmoid, transverse, and superior sagittal sinus. In addition, there is cortical vein thrombosis of a L parietal vein with associated venous infarct. Small petechial hemorrhage in stroke bed. Started on Heparin gtt overnight, hypercoagulable panel pending. Discussed imaging results with patient at bedside, and need to discontinue OCP and all hormonal meds.         Problem list:  - Sinus venous thrombosis   - L " Parietal infarct       Plan:  - Neurochecks Q4H  - Target normotension 100-130/60-80  - Continue Heparin gtt, given clot burden will need to continue on gtt for 3-4 days    - Will need DOAC for 6 months   - Hypercoagulable panel pending   - DC all hormonal medications including OCP  - H.A management per primary team   - Target normoglycemia  while admitted  - PT/OT/SLP  - DVT prophylaxis: Heparin gtt   - Will need follow up MRI brain wwo in 5-6 months   - Follow-up with stroke clinic outpatient        CATHY Ballard  Vascular Neurology, Acute Care Services       The evaluation of the patient, and recommended management, was discussed with Dr. Monreal, attending Vascular Neurologist.          Please note that this dictation was created using voice recognition software.  I have made every reasonable attempt to correct obvious errors, but I expect that there are errors of grammar and possibly content that I did not discover before finalizing the note.

## 2024-09-11 NOTE — HOSPITAL COURSE
Ms. Rama Marcelo is a 48 y.o. female who presented 9/10/2024 as a transfer from outside facility with concerns of cavernous sinus thrombosis.      Patient reports having headache progressively worsening over the last 2 weeks.  Reports the headache started in the front of her head behind her eyes and traveled to the back of her head down her neck and front of her neck.  Reports associated vomiting.  Reports today this morning she had difficulty finding her words.  Denies any fevers or chills.  CT imaging at outside facility showed no stroke but possible cavernous venous thrombosis.  Neuro on board and recommended MRV imaging and confirming diagnosis before initiating anticoagulation.      During this hospitalization, neurology Dr. Monreal was consulted.  Notable MRV revealed extensive sinus venous thrombosis of the right jugular vein, right sigmoid, transverse and superior sagittal sinus.  Patient initiated on hypercoagulable panel on heparin drip and will continue for 3-4 days prior to transitioning to oral DOAC and apixaban.  MRI brain demonstrate left parietal venous infarct with petechial hemorrhage however anticoagulation remains treatment for further development of venous infarcts and hemorrhage.  Patient will be monitored inpatient while on heparin drip and will be discharged to follow-up with outpatient stroke Bridge clinic and repeat MRI in approximately 6 months.

## 2024-09-11 NOTE — ED NOTES
Pt sleeping, airway patent, rr even and unlabored. no new complaints or distress noted at this time. Rails up times two, call light within reach.

## 2024-09-11 NOTE — ED NOTES
Med rec is complete per Patient at bedside.     Allergies reviewed.    Has patient had any outside antibiotics in the last 30 days? N    Any Anticoagulants (rivaroxaban, apixaban, edoxaban, dabigatran, warfarin, enoxaparin) taken in the last 14 days? N           Pharmacy/Pharmacies Pt utilizes : [facility]

## 2024-09-11 NOTE — H&P
Hospital Medicine History & Physical Note    Date of Service  9/10/2024    Primary Care Physician  [facility]    Consultants  None     Code Status  Full Code    Chief Complaint  Chief Complaint   Patient presents with    Headache    Slurred Speech       History of Presenting Illness  Rama Marcelo is a 48 y.o. female who presented 9/10/2024 as a transfer from outside facility with concerns of cavernous sinus thrombosis.  Patient reports having headache progressively worsening over the last 2 weeks.  Reports the headache started in the front of her head behind her eyes and traveled to the back of her head down her neck and front of her neck.  Reports associated vomiting.  Reports today this morning she had difficulty finding her words.  Denies any fevers or chills.  CT imaging at outside facility showed no stroke but possible cavernous venous thrombosis.  Neuro on board and recommended MRV imaging and confirming diagnosis before initiating anticoagulation.      I discussed the plan of care with patient.    Review of Systems  Review of Systems   Gastrointestinal:  Positive for vomiting.   Neurological:  Positive for headaches.       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    Surgical History   has a past surgical history that includes tubal coagulation laparoscopic bilateral (1999).     Family History  family history is not on file.   Family history reviewed with patient. There is no family history that is pertinent to the chief complaint.     Social History   reports that she has quit smoking. She has never used smokeless tobacco. She reports that she does not drink alcohol and does not use drugs.    Allergies  Allergies   Allergen Reactions    Amoxicillin Swelling     Swelling lips, blisters in mouth, hives    Apple Hives     Oral swelling, hives     Aspirin Swelling     TOLD NOT TO TAKE DUE TO NSAID ALLERGY    Eggplant Hives     Blisters in throat    Ibuprofen Hives and Swelling    Shrimp (Diagnostic) Hives and Swelling    Tomato Hives and Swelling    [place] Hives and Swelling     Per pt, she can eat other tree nuts like almonds and also tolerates peanuts    Milk-Related Compounds      GI symptoms       Medications  Prior to Admission Medications   Prescriptions Last Dose Informant Patient Reported? Taking?   Cholecalciferol (VITAMIN D3) 2000 UNIT Cap   Yes No   Sig: Take 1 Capsule by mouth every day.   Cyanocobalamin (VITAMIN B-12 INJ)   Yes No   Sig: Inject  as directed.   Norethin Ace-Eth Estrad-FE (MICROGESTIN 24 FE) 1-20 MG-MCG(24) Tab   No No   Sig: Take 1 Tablet by mouth every day.   Prenatal 27-1 MG Tab   Yes No   diphenhydrAMINE (BENADRYL) 25 MG Tab   Yes No   Sig: Take 25 mg by mouth every 6 hours as needed for Sleep or Itching.   ferrous gluconate (FERGON) 324 (38 Fe) MG Tab   Yes No   Sig: TAKE 2 TABLETS BY MOUTH 30 MINS PRIOR TO BREAKFAST ON MON/WED/FRI WITH VITAMIN C & A LARGE GLASS OF WATER   ondansetron (ZOFRAN ODT) 4 MG TABLET DISPERSIBLE   No No   Sig: Take 1 Tablet by mouth every 6 hours as needed for Nausea/Vomiting.      Facility-Administered Medications: None       Physical Exam  Temp:  [36.8 °C (98.3 °F)-36.9 °C (98.4 °F)] 36.8 °C (98.3 °F)  Pulse:  [58-86] 60  Resp:  [18-21] 18  BP: (116-156)/(58-89) 136/67  SpO2:  [94 %-97 %] 97 %  Blood Pressure: 136/67   Temperature: 36.8 °C (98.3 °F)   Pulse: 60   Respiration: 18   Pulse Oximetry: 97 %       Physical Exam  Vitals and nursing note reviewed.   Constitutional:       Appearance: Normal appearance.   HENT:      Head: Normocephalic and atraumatic.      Right Ear: Tympanic membrane normal.      Left Ear: Tympanic membrane normal.      Nose: Nose normal.      Mouth/Throat:      Mouth: Mucous membranes are moist.      Pharynx: Oropharynx is clear.   Eyes:      Extraocular Movements:  "Extraocular movements intact.      Pupils: Pupils are equal, round, and reactive to light.   Cardiovascular:      Rate and Rhythm: Normal rate and regular rhythm.      Pulses: Normal pulses.      Heart sounds: Normal heart sounds.   Pulmonary:      Effort: Pulmonary effort is normal.      Breath sounds: Normal breath sounds.   Abdominal:      General: Bowel sounds are normal. There is no distension.      Palpations: Abdomen is soft. There is no mass.   Musculoskeletal:         General: Normal range of motion.      Cervical back: Neck supple.   Skin:     General: Skin is warm.      Capillary Refill: Capillary refill takes less than 2 seconds.   Neurological:      Mental Status: She is alert. Mental status is at baseline.   Psychiatric:         Mood and Affect: Mood normal.         Behavior: Behavior normal.         Laboratory:  Recent Labs     09/10/24  1900 09/10/24  2201   WBC 9.1 10.1   RBC 4.94 4.56   HEMOGLOBIN 14.7 13.6   HEMATOCRIT 42.5 40.0   MCV 86.0 87.7   MCH 29.8 29.8   MCHC 34.6 34.0   RDW 13.6 44.2   PLATELETCT 225 200   MPV 10.2 10.1     Recent Labs     09/10/24  1900 09/10/24  2201   SODIUM 134* 134*   POTASSIUM 3.2* 3.4*   CHLORIDE 104 98   CO2 21* 20   GLUCOSE 112* 95   BUN 7 6*   CREATININE 0.6 0.52   CALCIUM 9.2 8.6     Recent Labs     09/10/24  1900 09/10/24  2201   ALTSGPT 584* 498*   ASTSGOT 443* 323*   ALKPHOSPHAT 108 95   TBILIRUBIN 1.3 0.9   GLUCOSE 112* 95     Recent Labs     09/10/24  1900 09/10/24  2201   APTT 25.6 21.5*   INR 0.99 1.06     No results for input(s): \"NTPROBNP\" in the last 72 hours.      No results for input(s): \"TROPONINT\" in the last 72 hours.    Imaging:  MR-VENOGRAM (MRV) HEAD    (Results Pending)   MR-BRAIN-W/O    (Results Pending)       no X-Ray or EKG requiring interpretation    Assessment/Plan:  Justification for Admission Status  I anticipate this patient is appropriate for observation status at this time because acute intractable headache requiring MRV imaging to " confirm cavernous sinus thrombosis.    Patient will need a Telemetry bed on NEUROLOGY service .  The need is secondary to see above.    * Acute intractable headache  Assessment & Plan  CT at outside facility showed no stroke with possible cavernous sinus thrombosis.  MRV ordered.  Supportive care with pain control until MRV confirms diagnosis of venous sinus thrombosis.  Will initiate therapeutic anticoagulation if diagnosis made on MRV and confirmed.  Neurochecks every 4 hours    Hypokalemia  Assessment & Plan  Replete   Check magnesium     Abnormal transaminases  Assessment & Plan  Chronic       Expressive aphasia- (present on admission)  Assessment & Plan  CT at outside facility showed no stroke with possible cavernous sinus thrombosis.  MRV ordered.  Supportive care with pain control until MRV confirms diagnosis of venous sinus thrombosis.  Will initiate therapeutic anticoagulation if diagnosis made on MRV and confirmed.  Neurochecks every 4 hours        VTE prophylaxis: SCDs/TEDs

## 2024-09-12 PROBLEM — G08 DURAL VENOUS SINUS THROMBOSIS: Status: ACTIVE | Noted: 2024-09-10

## 2024-09-12 LAB
ANION GAP SERPL CALC-SCNC: 12 MMOL/L (ref 7–16)
AT III ACT/NOR PPP CHRO: 93 % (ref 76–128)
AT III AG ACT/NOR PPP IA: 76 % (ref 82–136)
B2 GLYCOPROT1 IGG SERPL IA-ACNC: <10 SGU
B2 GLYCOPROT1 IGM SERPL IA-ACNC: <10 SMU
BUN SERPL-MCNC: 6 MG/DL (ref 8–22)
CALCIUM SERPL-MCNC: 9.1 MG/DL (ref 8.5–10.5)
CARDIOLIPIN IGA SER IA-ACNC: <10 APL
CARDIOLIPIN IGG SER IA-ACNC: <10 GPL
CARDIOLIPIN IGG SER IA-ACNC: <10 GPL
CARDIOLIPIN IGM SER IA-ACNC: <10 MPL
CARDIOLIPIN IGM SER IA-ACNC: <10 MPL
CHLORIDE SERPL-SCNC: 104 MMOL/L (ref 96–112)
CO2 SERPL-SCNC: 21 MMOL/L (ref 20–33)
CREAT SERPL-MCNC: 0.52 MG/DL (ref 0.5–1.4)
ERYTHROCYTE [DISTWIDTH] IN BLOOD BY AUTOMATED COUNT: 44 FL (ref 35.9–50)
GFR SERPLBLD CREATININE-BSD FMLA CKD-EPI: 112 ML/MIN/1.73 M 2
GLUCOSE SERPL-MCNC: 104 MG/DL (ref 65–99)
HCT VFR BLD AUTO: 42.3 % (ref 37–47)
HGB BLD-MCNC: 14.3 G/DL (ref 12–16)
MCH RBC QN AUTO: 29.7 PG (ref 27–33)
MCHC RBC AUTO-ENTMCNC: 33.8 G/DL (ref 32.2–35.5)
MCV RBC AUTO: 87.8 FL (ref 81.4–97.8)
PLATELET # BLD AUTO: 269 K/UL (ref 164–446)
PMV BLD AUTO: 10.7 FL (ref 9–12.9)
POTASSIUM SERPL-SCNC: 3.8 MMOL/L (ref 3.6–5.5)
RBC # BLD AUTO: 4.82 M/UL (ref 4.2–5.4)
SODIUM SERPL-SCNC: 137 MMOL/L (ref 135–145)
UFH PPP CHRO-ACNC: 0.47 IU/ML
WBC # BLD AUTO: 11.1 K/UL (ref 4.8–10.8)

## 2024-09-12 PROCEDURE — 85520 HEPARIN ASSAY: CPT

## 2024-09-12 PROCEDURE — 99291 CRITICAL CARE FIRST HOUR: CPT | Performed by: STUDENT IN AN ORGANIZED HEALTH CARE EDUCATION/TRAINING PROGRAM

## 2024-09-12 PROCEDURE — 99232 SBSQ HOSP IP/OBS MODERATE 35: CPT

## 2024-09-12 PROCEDURE — 770020 HCHG ROOM/CARE - TELE (206)

## 2024-09-12 PROCEDURE — 700102 HCHG RX REV CODE 250 W/ 637 OVERRIDE(OP): Performed by: STUDENT IN AN ORGANIZED HEALTH CARE EDUCATION/TRAINING PROGRAM

## 2024-09-12 PROCEDURE — A9270 NON-COVERED ITEM OR SERVICE: HCPCS | Performed by: STUDENT IN AN ORGANIZED HEALTH CARE EDUCATION/TRAINING PROGRAM

## 2024-09-12 PROCEDURE — 36415 COLL VENOUS BLD VENIPUNCTURE: CPT

## 2024-09-12 PROCEDURE — 80048 BASIC METABOLIC PNL TOTAL CA: CPT

## 2024-09-12 PROCEDURE — 700111 HCHG RX REV CODE 636 W/ 250 OVERRIDE (IP): Performed by: STUDENT IN AN ORGANIZED HEALTH CARE EDUCATION/TRAINING PROGRAM

## 2024-09-12 PROCEDURE — 85027 COMPLETE CBC AUTOMATED: CPT

## 2024-09-12 RX ADMIN — HEPARIN SODIUM 16 UNITS/KG/HR: 5000 INJECTION, SOLUTION INTRAVENOUS at 02:51

## 2024-09-12 RX ADMIN — ACETAMINOPHEN 1000 MG: 500 TABLET ORAL at 02:49

## 2024-09-12 RX ADMIN — ACETAMINOPHEN 1000 MG: 500 TABLET ORAL at 13:25

## 2024-09-12 ASSESSMENT — ENCOUNTER SYMPTOMS
GASTROINTESTINAL NEGATIVE: 1
EYES NEGATIVE: 1
CHILLS: 0
DIZZINESS: 1
FEVER: 0
PSYCHIATRIC NEGATIVE: 1
HEADACHES: 1
RESPIRATORY NEGATIVE: 1
CARDIOVASCULAR NEGATIVE: 1
WEAKNESS: 1
MYALGIAS: 1

## 2024-09-12 ASSESSMENT — PAIN DESCRIPTION - PAIN TYPE: TYPE: ACUTE PAIN

## 2024-09-12 ASSESSMENT — FIBROSIS 4 INDEX: FIB4 SCORE: 2.69

## 2024-09-12 NOTE — PROGRESS NOTES
Vascular Neurology Progress Note  Vascular Neurology Service, [facility]    Referring Physician: Jey Hernandez M.D.      Interval History: No acute events overnight. Reports H.A 4/10 today, localizes to R posterior head.     Review of systems: In addition to what is detailed in the HPI and/or updated in the interval history, all other systems reviewed and are negative.    Past Medical History, Past Surgical History and Social History reviewed and unchanged from prior    Medications:    Current Facility-Administered Medications:     heparin infusion 25,000 units in 500 mL 0.45% NACL, 0-30 Units/kg/hr (Adjusted), Intravenous, Continuous, Johnson Louis M.D., Last Rate: 18.5 mL/hr at 09/12/24 0705, 16 Units/kg/hr at 09/12/24 0705    heparin injection 2,300 Units, 40 Units/kg (Adjusted), Intravenous, PRN, Johnson Louis M.D.    ondansetron (Zofran) syringe/vial injection 4 mg, 4 mg, Intravenous, Q4HRS PRN, Johnson Louis M.D.    ondansetron (Zofran ODT) dispertab 4 mg, 4 mg, Oral, Q4HRS PRN, Johnson Louis M.D.    promethazine (Phenergan) tablet 12.5-25 mg, 12.5-25 mg, Oral, Q4HRS PRN, Johnson Louis M.D.    promethazine (Phenergan) suppository 12.5-25 mg, 12.5-25 mg, Rectal, Q4HRS PRN, Johnson Louis M.D.    prochlorperazine (Compazine) injection 5-10 mg, 5-10 mg, Intravenous, Q4HRS PRN, Johnson Louis M.D.    Pharmacy Consult Request ...Pain Management Review 1 Each, 1 Each, Other, PHARMACY TO DOSE, Johnson Louis M.D.    acetaminophen (Tylenol) tablet 1,000 mg, 1,000 mg, Oral, Q6HRS, 1,000 mg at 09/12/24 0249 **FOLLOWED BY** [START ON 9/16/2024] acetaminophen (Tylenol) tablet 1,000 mg, 1,000 mg, Oral, Q6HRS PRN, Johnson Louis M.D.    oxyCODONE immediate-release (Roxicodone) tablet 5 mg, 5 mg, Oral, Q3HRS PRN **OR** oxyCODONE immediate release (Roxicodone) tablet 10 mg, 10 mg, Oral, Q3HRS PRN **OR** HYDROmorphone (Dilaudid) injection 0.5 mg, 0.5 mg, Intravenous, Q3HRS PRN, Johnson ROCHA  "SHAUN Louis    Physical Examination:   /55   Pulse (!) 52 Comment: Rn notified   Temp 36.7 °C (98 °F) (Temporal)   Resp 16   Ht 1.549 m (5' 1\")   Wt 74.4 kg (164 lb 0.4 oz)   LMP 08/12/2024 (Exact Date) Comment: tubal ligation 1999  SpO2 93%   BMI 30.99 kg/m²       General: Patient is awake and in no acute distress  Eye: Examination of optic disks not indicated at this time given acuity of consult  Neck: There is normal range of motion  Extremities:  clear, dry, intact, without peripheral edema     NEUROLOGICAL EXAM:   Mental status: Awake, alert and fully oriented  Speech and language: Naming and repetition intact, fluent speech, follows simple, two-step, multi-step and complex commands.  Motor exam: There is sustained antigravity with no downward drift in bilateral arms and legs.  There is no pronator drift. Tone is normal. No abnormal movements were seen on exam.     RUE 5/5              LUE 5/5              RLL 5/5              LLL 5/5        Sensory exam: Reacts to tactile in all 4 extremities, no neglect to double stim   Coordination: No ataxia on elicited movements   Gait: Deferred     Objective Data:    Labs:  Estimated Creatinine Clearance: 119.3 mL/min (by C-G formula based on SCr of 0.52 mg/dL).  Recent Labs     09/10/24  1900 09/10/24  2201 09/12/24  0043   SODIUM 134* 134* 137   POTASSIUM 3.2* 3.4* 3.8   CHLORIDE 104 98 104   CO2 21* 20 21   GLUCOSE 112* 95 104*   BUN 7 6* 6*   CREATININE 0.6 0.52 0.52     Recent Labs     09/10/24  1900 09/10/24  2201 09/12/24  0043   GLUCOSE 112* 95 104*     Recent Labs     09/10/24  1900 09/10/24  2201   ASTSGOT 443* 323*   ALTSGPT 584* 498*   ALKPHOSPHAT 108 95     Recent Labs     09/10/24  1900 09/10/24  2201 09/12/24  0043   WBC 9.1 10.1 11.1*   HEMOGLOBIN 14.7 13.6 14.3   PLATELETCT 225 200 269     Lab Results   Component Value Date/Time    PROTHROMBTM 14.6 09/11/2024 01:23 AM    INR 1.13 09/11/2024 01:23 AM      No results found for: \"TROPONINT\", " "\"NTPROBNP\"  No results found for: \"HBA1C\"  No results found for: \"LDL\", \"HDL\", \"TRIGLYCERIDE\", \"CHOLSTRLTOT\"      Imaging/Testing:    I interpreted and/or reviewed the patient's neuroimaging    MR-BRAIN-W/O   Final Result      1.  Dural sinus thrombosis involving the right internal jugular vein, sigmoid sinus, transverse sinus and sagittal sinus.   2.  Thrombosis of a left posterior cerebral vein overlying the parietal cortex with associated venous infarct.      Attempts to convey these findings to Dr. Louis were initiated on 9/11/2024 12:16 AM. Findings were conveyed to Dr. Louis on 9/11/2024 12:26 AM.      MR-VENOGRAM (MRV) HEAD   Final Result         1.  Occlusive dural venous sinus thrombosis involving the sagittal sinus, right transverse and sigmoid sinuses and proximal right jugular vein. Additionally, there appears to be thrombosis of a left posterior superior cerebral vein overlying the parietal    lobe.   2.  Left transverse and sigmoid sinuses and left jugular vein appear patent.      Attempts to convey these findings to Dr. Louis were initiated on 9/11/2024 12:16 AM. Findings were conveyed to Dr. Louis on 9/11/2024 12:26 AM.      DX-LUMBAR SPINE-2 OR 3 VIEWS    (Results Pending)   DX-CERVICAL SPINE-2 OR 3 VIEWS    (Results Pending)       Assessment and Plan:    Rama Marcelo is a 48 y.o. female transferred from [facility] for evaluation of sinus thromboses. MRI brain and MRV on arrival revealed extensive sinus venous thrombosis of the R jugular vein, R sigmoid, transverse, and superior sagittal sinus. In addition, there is cortical vein thrombosis of a L parietal vein with associated venous infarct. Small petechial hemorrhage in stroke bed. Despite petechial hemorrhage anticoagulation is necessary to prevent further venous infarcts and hemorrhage.            Problem list:  - Sinus venous thrombosis   - L Parietal infarct         Plan:  - Neurochecks Q4H  - Target normotension 100-130/60-80  - " Continue Heparin gtt, given clot burden will consider transitioning to DOAC this weekend               - Will need DOAC for 6 months   - Hypercoagulable panel pending   - DC all hormonal medications including OCP  - H.A management per primary team   - Target normoglycemia  while admitted  - PT/OT/SLP  - DVT prophylaxis: Heparin gtt   - Will need follow up MRI brain wwo in 5-6 months   - Follow-up with stroke clinic outpatient      CATHY Ballard  Vascular Neurology    The evaluation of the patient, and recommended management, was discussed with Dr. Cabello, the attending Vascular Neurologist. I have performed a physical exam and reviewed and updated ROS and Plan today (9/12/2024).

## 2024-09-12 NOTE — PROGRESS NOTES
Hospital Medicine Daily Progress Note    Date of Service  9/12/2024    Chief Complaint  Rama Marcelo is a 48 y.o. female admitted 9/10/2024 with severe and worsening headaches    Hospital Course  Ms. Rama Marcelo is a 48 y.o. female who presented 9/10/2024 as a transfer from outside facility with concerns of cavernous sinus thrombosis.      Patient reports having headache progressively worsening over the last 2 weeks.  Reports the headache started in the front of her head behind her eyes and traveled to the back of her head down her neck and front of her neck.  Reports associated vomiting.  Reports today this morning she had difficulty finding her words.  Denies any fevers or chills.  CT imaging at outside facility showed no stroke but possible cavernous venous thrombosis.  Neuro on board and recommended MRV imaging and confirming diagnosis before initiating anticoagulation.      During this hospitalization, neurology Dr. Monreal was consulted.  Notable MRV revealed extensive sinus venous thrombosis of the right jugular vein, right sigmoid, transverse and superior sagittal sinus.  Patient initiated on hypercoagulable panel on heparin drip and will continue for 3-4 days prior to transitioning to oral DOAC and apixaban.  MRI brain demonstrate left parietal venous infarct with petechial hemorrhage however anticoagulation remains treatment for further development of venous infarcts and hemorrhage.  Patient will be monitored inpatient while on heparin drip and will be discharged to follow-up with outpatient stroke [facility] clinic and repeat MRI in approximately 6 months.    Interval Problem Update  No acute events overnight.  Patient reports some neck pain, otherwise feels okay.  Continue heparin drip for venous sinus thrombosis.  Neurology following, appreciate recommendations.    I have discussed this patient's plan of care and discharge plan at IDT rounds today with Case Management, Nursing, Nursing  leadership, and other members of the IDT team.    Consultants/Specialty  neurology    Code Status  Full Code    Disposition  Medically Cleared  I have placed the appropriate orders for post-discharge needs.    Review of Systems  Review of Systems   Constitutional:  Positive for malaise/fatigue. Negative for chills and fever.   HENT: Negative.     Eyes: Negative.    Respiratory: Negative.     Cardiovascular: Negative.    Gastrointestinal: Negative.    Genitourinary: Negative.    Musculoskeletal:  Positive for myalgias.   Skin: Negative.    Neurological:  Positive for dizziness, weakness and headaches.   Endo/Heme/Allergies: Negative.    Psychiatric/Behavioral: Negative.          Physical Exam  Temp:  [36.1 °C (97 °F)-37 °C (98.6 °F)] 36.1 °C (97 °F)  Pulse:  [52-67] 57  Resp:  [16-18] 18  BP: (106-128)/(55-76) 119/70  SpO2:  [93 %-94 %] 93 %    Physical Exam  Vitals and nursing note reviewed.   Constitutional:       Appearance: She is obese.   HENT:      Head: Normocephalic.      Nose: Nose normal.      Mouth/Throat:      Mouth: Mucous membranes are moist.      Pharynx: Oropharynx is clear.   Eyes:      Pupils: Pupils are equal, round, and reactive to light.   Cardiovascular:      Rate and Rhythm: Normal rate and regular rhythm.      Pulses: Normal pulses.      Heart sounds: Normal heart sounds.   Pulmonary:      Effort: Pulmonary effort is normal.      Breath sounds: Normal breath sounds.   Abdominal:      General: Bowel sounds are normal.   Musculoskeletal:         General: Tenderness present.      Cervical back: Normal range of motion and neck supple.   Skin:     General: Skin is dry.   Neurological:      Mental Status: She is alert. Mental status is at baseline.      Motor: Weakness present.         Fluids    Intake/Output Summary (Last 24 hours) at 9/12/2024 1615  Last data filed at 9/12/2024 0400  Gross per 24 hour   Intake 640 ml   Output --   Net 640 ml        Laboratory  Recent Labs     09/10/24  1900  09/10/24  2201 09/12/24  0043   WBC 9.1 10.1 11.1*   RBC 4.94 4.56 4.82   HEMOGLOBIN 14.7 13.6 14.3   HEMATOCRIT 42.5 40.0 42.3   MCV 86.0 87.7 87.8   MCH 29.8 29.8 29.7   MCHC 34.6 34.0 33.8   RDW 13.6 44.2 44.0   PLATELETCT 225 200 269   MPV 10.2 10.1 10.7     Recent Labs     09/10/24  1900 09/10/24  2201 09/12/24  0043   SODIUM 134* 134* 137   POTASSIUM 3.2* 3.4* 3.8   CHLORIDE 104 98 104   CO2 21* 20 21   GLUCOSE 112* 95 104*   BUN 7 6* 6*   CREATININE 0.6 0.52 0.52   CALCIUM 9.2 8.6 9.1     Recent Labs     09/10/24  1900 09/10/24  2201 09/11/24  0123   APTT 25.6 21.5* 26.4   INR 0.99 1.06 1.13               Imaging  MR-BRAIN-W/O   Final Result      1.  Dural sinus thrombosis involving the right internal jugular vein, sigmoid sinus, transverse sinus and sagittal sinus.   2.  Thrombosis of a left posterior cerebral vein overlying the parietal cortex with associated venous infarct.      Attempts to convey these findings to Dr. Louis were initiated on 9/11/2024 12:16 AM. Findings were conveyed to Dr. Louis on 9/11/2024 12:26 AM.      MR-VENOGRAM (MRV) HEAD   Final Result         1.  Occlusive dural venous sinus thrombosis involving the sagittal sinus, right transverse and sigmoid sinuses and proximal right jugular vein. Additionally, there appears to be thrombosis of a left posterior superior cerebral vein overlying the parietal    lobe.   2.  Left transverse and sigmoid sinuses and left jugular vein appear patent.      Attempts to convey these findings to Dr. Louis were initiated on 9/11/2024 12:16 AM. Findings were conveyed to Dr. Louis on 9/11/2024 12:26 AM.           Assessment/Plan  * Dural venous sinus thrombosis  Assessment & Plan  CT at outside facility showed no stroke with possible cavernous sinus thrombosis.   MRI confirms dural sinus thrombosis  Continue heparin drip  Neurochecks every 4 hours  Neurology Dr. Monreal following  STAT CT head if patient shows acute neurologic deterioration or  obtundation    Hypokalemia  Assessment & Plan  Replete   Check magnesium     Abnormal transaminases  Assessment & Plan  Chronic       Expressive aphasia- (present on admission)  Assessment & Plan  CT at outside facility showed no stroke with possible cavernous sinus thrombosis.   Resolved  MRI shows venous sinus thrombosis, no acute stroke  Neurology following  See recommendations         VTE prophylaxis: VTE Selection    I have performed a physical exam and reviewed and updated ROS and Plan today (9/12/2024). In review of yesterday's note (9/11/2024), there are no changes except as documented above.    Patient is critically ill.   The patient continues to have: dural venous sinus thrombosis  The vital organ system that is affected is the: venous sinus of brain  If untreated there is a high chance of deterioration into: worsening clot, possible brain herniation And eventually death.   The critical care that I am providing today is: managing thrombosis treatment with heparin drip requiring close therapeutic and toxicity monitoring  The critical that has been undertaken is medically complex.   There has been no overlap in critical care time.   Critical Care Time not including procedures: 37 minutes

## 2024-09-12 NOTE — CARE PLAN
The patient is Stable - Low risk of patient condition declining or worsening    Shift Goals  Clinical Goals: therapeutic Xa, stable neuro assessments, pain management  Patient Goals: pain control  Family Goals: mary    Progress made toward(s) clinical / shift goals:      Problem: Neuro Status  Goal: Neuro status will remain stable or improve  Outcome: Progressing  Q4h neuro checks in place. Pt's neurological status (A/Ox4) remains unchanged throughout shift.     Problem: Pain - Standard  Goal: Alleviation of pain or a reduction in pain to the patient’s comfort goal  Outcome: Progressing  1.  Document pain using the appropriate pain scale.  2.  Educate and implement non-pharmacologic comfort measures (i.e. relaxation, distraction, massage, cold/heat therapy, etc.)  3.  Pain management medications as ordered.  4.  Reassess pain after pain med administration per policy.    Problem: Hemodynamics  Goal: Patient's hemodynamics, fluid balance and neurologic status will be stable or improve  Outcome: Progressing       Patient is not progressing towards the following goals: n/a

## 2024-09-12 NOTE — PROGRESS NOTES
Monitor Summary: SR/SB 48-68, OK 0.17, QRS 0.09, QT 0.41, with rare PVCs per strip from monitor room.

## 2024-09-12 NOTE — PROGRESS NOTES
Patient scored a Moderate Risk on the [place] To Fall Risk Assessment. Patient educated on the importance of utilizing bed alarms by this RN and charge RN. Patient still refused bed alarm and verbalizes understanding.

## 2024-09-12 NOTE — CARE PLAN
Problem: Knowledge Deficit - Standard  Goal: Patient and family/care givers will demonstrate understanding of plan of care, disease process/condition, diagnostic tests and medications  Outcome: Progressing     Problem: Communication  Goal: The ability to communicate needs accurately and effectively will improve  Outcome: Progressing     Problem: Mobility  Goal: Patient's capacity to carry out activities will improve  Outcome: Progressing     Problem: Self Care  Goal: Patient will have the ability to perform ADLs independently or with assistance (bathe, groom, dress, toilet and feed)  Outcome: Progressing   The patient is Stable - Low risk of patient condition declining or worsening    Shift Goals  Clinical Goals: therapeutic heparin  Patient Goals: rest  Family Goals: mary    Progress made toward(s) clinical / shift goals:  continuous heparin drip      Patient is not progressing towards the following goals:

## 2024-09-13 LAB
APTT SCREEN TO CONFIRM RATIO: 1.01 {RATIO}
CONFIRM DRVVT STA-STACLOT: NORMAL S
DRVVT SCREEN TO CONFIRM RATIO: 0.93 {RATIO}
DRVVT SCREEN TO CONFIRM RATIO: NORMAL {RATIO}
DRVVT/DRVVT CFM P DOAC NEUT NORM PPP-RTO: NORMAL {RATIO}
ERYTHROCYTE [DISTWIDTH] IN BLOOD BY AUTOMATED COUNT: 45.1 FL (ref 35.9–50)
HCT VFR BLD AUTO: 42.2 % (ref 37–47)
HEPARIN NT PPP QL: NORMAL
HGB BLD-MCNC: 14 G/DL (ref 12–16)
LA 3 SCREEN W REFLEX-IMP: NORMAL
LMW HEPARIN IND PLT AB SER QL: NORMAL
MCH RBC QN AUTO: 29 PG (ref 27–33)
MCHC RBC AUTO-ENTMCNC: 33.2 G/DL (ref 32.2–35.5)
MCV RBC AUTO: 87.6 FL (ref 81.4–97.8)
MIXING DRVVT: NORMAL S
PLATELET # BLD AUTO: 264 K/UL (ref 164–446)
PMV BLD AUTO: 10 FL (ref 9–12.9)
PROT C ACT/NOR PPP: 45 % (ref 83–168)
PROT S ACT/NOR PPP: 72 % (ref 57–131)
PROTHROMBIN TIME: 13.6 S (ref 12–15.5)
RBC # BLD AUTO: 4.82 M/UL (ref 4.2–5.4)
SCREEN APTT: NORMAL S
TESTING SUMMARY NL11779: NORMAL
THROMBIN TIME: NORMAL S
UFH PPP CHRO-ACNC: 0.38 IU/ML
WBC # BLD AUTO: 6.7 K/UL (ref 4.8–10.8)

## 2024-09-13 PROCEDURE — 700111 HCHG RX REV CODE 636 W/ 250 OVERRIDE (IP): Performed by: STUDENT IN AN ORGANIZED HEALTH CARE EDUCATION/TRAINING PROGRAM

## 2024-09-13 PROCEDURE — 700102 HCHG RX REV CODE 250 W/ 637 OVERRIDE(OP): Performed by: STUDENT IN AN ORGANIZED HEALTH CARE EDUCATION/TRAINING PROGRAM

## 2024-09-13 PROCEDURE — 85520 HEPARIN ASSAY: CPT

## 2024-09-13 PROCEDURE — 770020 HCHG ROOM/CARE - TELE (206)

## 2024-09-13 PROCEDURE — 85027 COMPLETE CBC AUTOMATED: CPT

## 2024-09-13 PROCEDURE — 99291 CRITICAL CARE FIRST HOUR: CPT | Performed by: STUDENT IN AN ORGANIZED HEALTH CARE EDUCATION/TRAINING PROGRAM

## 2024-09-13 PROCEDURE — 99231 SBSQ HOSP IP/OBS SF/LOW 25: CPT

## 2024-09-13 PROCEDURE — A9270 NON-COVERED ITEM OR SERVICE: HCPCS | Performed by: STUDENT IN AN ORGANIZED HEALTH CARE EDUCATION/TRAINING PROGRAM

## 2024-09-13 RX ADMIN — ACETAMINOPHEN 1000 MG: 500 TABLET ORAL at 01:52

## 2024-09-13 RX ADMIN — HEPARIN SODIUM 16 UNITS/KG/HR: 5000 INJECTION, SOLUTION INTRAVENOUS at 03:39

## 2024-09-13 ASSESSMENT — PATIENT HEALTH QUESTIONNAIRE - PHQ9
2. FEELING DOWN, DEPRESSED, IRRITABLE, OR HOPELESS: SEVERAL DAYS
7. TROUBLE CONCENTRATING ON THINGS, SUCH AS READING THE NEWSPAPER OR WATCHING TELEVISION: NOT AT ALL
SUM OF ALL RESPONSES TO PHQ9 QUESTIONS 1 AND 2: 1
6. FEELING BAD ABOUT YOURSELF - OR THAT YOU ARE A FAILURE OR HAVE LET YOURSELF OR YOUR FAMILY DOWN: NOT AL ALL
5. POOR APPETITE OR OVEREATING: NOT AT ALL
4. FEELING TIRED OR HAVING LITTLE ENERGY: SEVERAL DAYS
1. LITTLE INTEREST OR PLEASURE IN DOING THINGS: NOT AT ALL
9. THOUGHTS THAT YOU WOULD BE BETTER OFF DEAD, OR OF HURTING YOURSELF: NOT AT ALL
SUM OF ALL RESPONSES TO PHQ QUESTIONS 1-9: 2
8. MOVING OR SPEAKING SO SLOWLY THAT OTHER PEOPLE COULD HAVE NOTICED. OR THE OPPOSITE, BEING SO FIGETY OR RESTLESS THAT YOU HAVE BEEN MOVING AROUND A LOT MORE THAN USUAL: NOT AT ALL
3. TROUBLE FALLING OR STAYING ASLEEP OR SLEEPING TOO MUCH: NOT AT ALL

## 2024-09-13 ASSESSMENT — ENCOUNTER SYMPTOMS
EYES NEGATIVE: 1
DIZZINESS: 1
PSYCHIATRIC NEGATIVE: 1
HEADACHES: 1
CHILLS: 0
CARDIOVASCULAR NEGATIVE: 1
WEAKNESS: 1
RESPIRATORY NEGATIVE: 1
MYALGIAS: 1
GASTROINTESTINAL NEGATIVE: 1
FEVER: 0

## 2024-09-13 ASSESSMENT — PAIN DESCRIPTION - PAIN TYPE
TYPE: ACUTE PAIN
TYPE: ACUTE PAIN

## 2024-09-13 ASSESSMENT — FIBROSIS 4 INDEX: FIB4 SCORE: 2.74

## 2024-09-13 NOTE — DISCHARGE PLANNING
Care Transition Team Assessment    Met with pt at bedside to complete dc planning assessment.   Pt lives with her daughter and granddaughter.   Pt is independent with self care and doesn't use any DME  Pt works fulltime as a caregiver for [employer]  Pt see's her PCP and gets medications filled at the [facility] in [place].   Pt states family will provide transportation home at d/c.     Information Source  Orientation Level: Oriented X4  Information Given By: Patient    Elopement Risk  Legal Hold: No  Ambulatory or Self Mobile in Wheelchair: Yes  Disoriented: No  Psychiatric Symptoms: None  History of Wandering: No  Elopement this Admit: No  Vocalizing Wanting to Leave: No  Displays Behaviors, Body Language Wanting to Leave: No-Not at Risk for Elopement  Elopement Risk: Not at Risk for Elopement    Interdisciplinary Discharge Planning  Lives with - Patient's Self Care Capacity: Adult Children, Child Less than 18 Years of Age  Patient or legal guardian wants to designate a caregiver: No  Support Systems: Children  Housing / Facility: [address]    Discharge Preparedness  What is your plan after discharge?: Home with help  What are your discharge supports?: Child  Prior Functional Level: Ambulatory, Independent with Activities of Daily Living, Independent with Medication Management  Difficulity with ADLs: None  Difficulity with IADLs: None    Functional Assesment  Prior Functional Level: Ambulatory, Independent with Activities of Daily Living, Independent with Medication Management    Finances  Financial Barriers to Discharge: No  Prescription Coverage: Yes    Vision / Hearing Impairment  Right Eye Vision: Impaired, Wears Glasses  Left Eye Vision: Impaired, Wears Glasses  Hearing Impairment : No    Domestic Abuse  Have you ever been the victim of abuse or violence?: No  Possible Abuse/Neglect Reported to:: Not Applicable    Psychological Assessment  History of Substance Abuse: None  History of Psychiatric  Problems: No  Non-compliant with Treatment: No  Newly Diagnosed Illness: Yes    Discharge Risks or Barriers  Discharge risks or barriers?: No    Anticipated Discharge Information  Discharge Disposition: Discharged to home/self care (01)

## 2024-09-13 NOTE — PROGRESS NOTES
Patient scored a Low Risk on the [place] To Fall Risk Assessment. Patient educated on the importance of utilizing bed alarms by this RN and charge RN. Patient still refused bed alarm and verbalizes understanding.

## 2024-09-13 NOTE — PROGRESS NOTES
Hospital Medicine Daily Progress Note    Date of Service  9/13/2024    Chief Complaint  Rama Marcelo is a 48 y.o. female admitted 9/10/2024 with severe and worsening headaches    Hospital Course  Ms. Rama Marcelo is a 48 y.o. female who presented 9/10/2024 as a transfer from outside facility with concerns of cavernous sinus thrombosis.      Patient reports having headache progressively worsening over the last 2 weeks.  Reports the headache started in the front of her head behind her eyes and traveled to the back of her head down her neck and front of her neck.  Reports associated vomiting.  Reports today this morning she had difficulty finding her words.  Denies any fevers or chills.  CT imaging at outside facility showed no stroke but possible cavernous venous thrombosis.  Neuro on board and recommended MRV imaging and confirming diagnosis before initiating anticoagulation.      During this hospitalization, neurology Dr. Monreal was consulted.  Notable MRV revealed extensive sinus venous thrombosis of the right jugular vein, right sigmoid, transverse and superior sagittal sinus.  Patient initiated on hypercoagulable panel on heparin drip and will continue for 3-4 days prior to transitioning to oral DOAC and apixaban.  MRI brain demonstrate left parietal venous infarct with petechial hemorrhage however anticoagulation remains treatment for further development of venous infarcts and hemorrhage.  Patient will be monitored inpatient while on heparin drip and will be discharged to follow-up with outpatient stroke Bridge clinic and repeat MRI in approximately 6 months.    Interval Problem Update  No acute events overnight.  Patient feels well, has no complaints. Neck pain improved.  Continue heparin drip for venous sinus thrombosis.  Neurology following, appreciate recommendations.    I have discussed this patient's plan of care and discharge plan at IDT rounds today with Case Management, Nursing,  Nursing leadership, and other members of the IDT team.    Consultants/Specialty  neurology    Code Status  Full Code    Disposition  The patient is not medically cleared for discharge to home or a post-acute facility.  Anticipate discharge to: home with close outpatient follow-up    I have placed the appropriate orders for post-discharge needs.    Review of Systems  Review of Systems   Constitutional:  Positive for malaise/fatigue. Negative for chills and fever.   HENT: Negative.     Eyes: Negative.    Respiratory: Negative.     Cardiovascular: Negative.    Gastrointestinal: Negative.    Genitourinary: Negative.    Musculoskeletal:  Positive for myalgias.   Skin: Negative.    Neurological:  Positive for dizziness, weakness and headaches.   Endo/Heme/Allergies: Negative.    Psychiatric/Behavioral: Negative.          Physical Exam  Temp:  [36.1 °C (97 °F)-37.2 °C (98.9 °F)] 36.1 °C (97 °F)  Pulse:  [57-68] 63  Resp:  [17-19] 17  BP: (119-137)/(53-79) 129/79  SpO2:  [94 %-97 %] 96 %    Physical Exam  Vitals and nursing note reviewed.   Constitutional:       Appearance: She is obese.   HENT:      Head: Normocephalic.      Nose: Nose normal.      Mouth/Throat:      Mouth: Mucous membranes are moist.      Pharynx: Oropharynx is clear.   Eyes:      Pupils: Pupils are equal, round, and reactive to light.   Cardiovascular:      Rate and Rhythm: Normal rate and regular rhythm.      Pulses: Normal pulses.      Heart sounds: Normal heart sounds.   Pulmonary:      Effort: Pulmonary effort is normal.      Breath sounds: Normal breath sounds.   Abdominal:      General: Bowel sounds are normal.   Musculoskeletal:         General: Tenderness present.      Cervical back: Normal range of motion and neck supple.   Skin:     General: Skin is dry.   Neurological:      Mental Status: She is alert. Mental status is at baseline.      Motor: Weakness present.         Fluids    Intake/Output Summary (Last 24 hours) at 9/13/2024 1420  Last  data filed at 9/13/2024 1200  Gross per 24 hour   Intake 1120 ml   Output --   Net 1120 ml        Laboratory  Recent Labs     09/10/24  2201 09/12/24  0043 09/13/24  0139   WBC 10.1 11.1* 6.7   RBC 4.56 4.82 4.82   HEMOGLOBIN 13.6 14.3 14.0   HEMATOCRIT 40.0 42.3 42.2   MCV 87.7 87.8 87.6   MCH 29.8 29.7 29.0   MCHC 34.0 33.8 33.2   RDW 44.2 44.0 45.1   PLATELETCT 200 269 264   MPV 10.1 10.7 10.0     Recent Labs     09/10/24  1900 09/10/24  2201 09/12/24  0043   SODIUM 134* 134* 137   POTASSIUM 3.2* 3.4* 3.8   CHLORIDE 104 98 104   CO2 21* 20 21   GLUCOSE 112* 95 104*   BUN 7 6* 6*   CREATININE 0.6 0.52 0.52   CALCIUM 9.2 8.6 9.1     Recent Labs     09/10/24  1900 09/10/24  2201 09/11/24  0123 09/11/24  0124   PT  --   --   --  13.6   APTT 25.6 21.5* 26.4  --    INR 0.99 1.06 1.13  --                Imaging  MR-BRAIN-W/O   Final Result      1.  Dural sinus thrombosis involving the right internal jugular vein, sigmoid sinus, transverse sinus and sagittal sinus.   2.  Thrombosis of a left posterior cerebral vein overlying the parietal cortex with associated venous infarct.      Attempts to convey these findings to Dr. Louis were initiated on 9/11/2024 12:16 AM. Findings were conveyed to Dr. Louis on 9/11/2024 12:26 AM.      MR-VENOGRAM (MRV) HEAD   Final Result         1.  Occlusive dural venous sinus thrombosis involving the sagittal sinus, right transverse and sigmoid sinuses and proximal right jugular vein. Additionally, there appears to be thrombosis of a left posterior superior cerebral vein overlying the parietal    lobe.   2.  Left transverse and sigmoid sinuses and left jugular vein appear patent.      Attempts to convey these findings to Dr. Louis were initiated on 9/11/2024 12:16 AM. Findings were conveyed to Dr. Louis on 9/11/2024 12:26 AM.           Assessment/Plan  * Dural venous sinus thrombosis  Assessment & Plan  CT at outside facility showed no stroke with possible cavernous sinus thrombosis.    MRI confirms dural sinus thrombosis  Continue heparin drip  Neurochecks every 4 hours  Neurology Dr. Monreal following  STAT CT head if patient shows acute neurologic deterioration or obtundation    Hypokalemia  Assessment & Plan  Replete   Check magnesium     Abnormal transaminases  Assessment & Plan  Chronic       Expressive aphasia- (present on admission)  Assessment & Plan  CT at outside facility showed no stroke with possible cavernous sinus thrombosis.   Resolved  MRI shows venous sinus thrombosis, no acute stroke  Neurology following  See recommendations         VTE prophylaxis: VTE Selection    I have performed a physical exam and reviewed and updated ROS and Plan today (9/13/2024). In review of yesterday's note (9/12/2024), there are no changes except as documented above.    Patient is critically ill.   The patient continues to have: dural venous sinus thrombosis  The vital organ system that is affected is the: venous sinus of brain  If untreated there is a high chance of deterioration into: worsening clot, possible brain herniation And eventually death.   The critical care that I am providing today is: managing thrombosis treatment with heparin drip requiring close therapeutic and toxicity monitoring  The critical that has been undertaken is medically complex.   There has been no overlap in critical care time.   Critical Care Time not including procedures: 32 minutes

## 2024-09-13 NOTE — PROGRESS NOTES
Vascular Neurology Progress Note  Vascular Neurology Service, [facility]    Referring Physician: Jey Hernandez M.D.      Interval History: No acute events overnight. No Headache currently, no neurological symptoms.     Review of systems: In addition to what is detailed in the HPI and/or updated in the interval history, all other systems reviewed and are negative.    Past Medical History, Past Surgical History and Social History reviewed and unchanged from prior    Medications:    Current Facility-Administered Medications:     heparin infusion 25,000 units in 500 mL 0.45% NACL, 0-30 Units/kg/hr (Adjusted), Intravenous, Continuous, Johnson Louis M.D., Last Rate: 18.5 mL/hr at 09/13/24 0659, 16 Units/kg/hr at 09/13/24 0659    heparin injection 2,300 Units, 40 Units/kg (Adjusted), Intravenous, PRN, Johnson Louis M.D.    ondansetron (Zofran) syringe/vial injection 4 mg, 4 mg, Intravenous, Q4HRS PRN, Johnson Louis M.D.    ondansetron (Zofran ODT) dispertab 4 mg, 4 mg, Oral, Q4HRS PRN, Johnson Louis M.D.    promethazine (Phenergan) tablet 12.5-25 mg, 12.5-25 mg, Oral, Q4HRS PRN, Johnson Louis M.D.    promethazine (Phenergan) suppository 12.5-25 mg, 12.5-25 mg, Rectal, Q4HRS PRN, Johnson Louis M.D.    prochlorperazine (Compazine) injection 5-10 mg, 5-10 mg, Intravenous, Q4HRS PRN, Jonhson Louis M.D.    Pharmacy Consult Request ...Pain Management Review 1 Each, 1 Each, Other, PHARMACY TO DOSE, Johnson Louis M.D.    acetaminophen (Tylenol) tablet 1,000 mg, 1,000 mg, Oral, Q6HRS, 1,000 mg at 09/13/24 0152 **FOLLOWED BY** [START ON 9/16/2024] acetaminophen (Tylenol) tablet 1,000 mg, 1,000 mg, Oral, Q6HRS PRN, Johnson Louis M.D.    oxyCODONE immediate-release (Roxicodone) tablet 5 mg, 5 mg, Oral, Q3HRS PRN **OR** oxyCODONE immediate release (Roxicodone) tablet 10 mg, 10 mg, Oral, Q3HRS PRN **OR** HYDROmorphone (Dilaudid) injection 0.5 mg, 0.5 mg, Intravenous, Q3HRS PRN, Johnson Louis,  "M.D.    Physical Examination:   /68   Pulse 68   Temp 36.1 °C (97 °F) (Temporal)   Resp 17   Ht 1.549 m (5' 1\")   Wt 74.8 kg (164 lb 14.5 oz)   LMP 08/12/2024 (Exact Date) Comment: tubal ligation 1999  SpO2 97%   BMI 31.16 kg/m²       General: Patient is awake and in no acute distress  Eye: Examination of optic disks not indicated at this time given acuity of consult  Neck: There is normal range of motion  Extremities:  clear, dry, intact, without peripheral edema     NEUROLOGICAL EXAM:   Mental status: Awake, alert and fully oriented  Speech and language: Naming and repetition intact, fluent speech, follows simple, two-step, multi-step and complex commands.  Motor exam: There is sustained antigravity with no downward drift in bilateral arms and legs.  There is no pronator drift. Tone is normal. No abnormal movements were seen on exam.     RUE 5/5              LUE 5/5              RLL 5/5              LLL 5/5        Sensory exam: Reacts to tactile in all 4 extremities, no neglect to double stim   Coordination: No ataxia on elicited movements   Gait: Deferred     Objective Data:    Labs:  Estimated Creatinine Clearance: 119.7 mL/min (by C-G formula based on SCr of 0.52 mg/dL).  Recent Labs     09/10/24  1900 09/10/24  2201 09/12/24  0043   SODIUM 134* 134* 137   POTASSIUM 3.2* 3.4* 3.8   CHLORIDE 104 98 104   CO2 21* 20 21   GLUCOSE 112* 95 104*   BUN 7 6* 6*   CREATININE 0.6 0.52 0.52     Recent Labs     09/10/24  1900 09/10/24  2201 09/12/24  0043   GLUCOSE 112* 95 104*     Recent Labs     09/10/24  1900 09/10/24  2201   ASTSGOT 443* 323*   ALTSGPT 584* 498*   ALKPHOSPHAT 108 95     Recent Labs     09/10/24  2201 09/12/24  0043 09/13/24  0139   WBC 10.1 11.1* 6.7   HEMOGLOBIN 13.6 14.3 14.0   PLATELETCT 200 269 264     Lab Results   Component Value Date/Time    PROTHROMBTM 14.6 09/11/2024 01:23 AM    INR 1.13 09/11/2024 01:23 AM      No results found for: \"TROPONINT\", \"NTPROBNP\"  No results found for: " "\"HBA1C\"  No results found for: \"LDL\", \"HDL\", \"TRIGLYCERIDE\", \"CHOLSTRLTOT\"      Imaging/Testing:    I interpreted and/or reviewed the patient's neuroimaging    MR-BRAIN-W/O   Final Result      1.  Dural sinus thrombosis involving the right internal jugular vein, sigmoid sinus, transverse sinus and sagittal sinus.   2.  Thrombosis of a left posterior cerebral vein overlying the parietal cortex with associated venous infarct.      Attempts to convey these findings to Dr. Louis were initiated on 9/11/2024 12:16 AM. Findings were conveyed to Dr. Louis on 9/11/2024 12:26 AM.      MR-VENOGRAM (MRV) HEAD   Final Result         1.  Occlusive dural venous sinus thrombosis involving the sagittal sinus, right transverse and sigmoid sinuses and proximal right jugular vein. Additionally, there appears to be thrombosis of a left posterior superior cerebral vein overlying the parietal    lobe.   2.  Left transverse and sigmoid sinuses and left jugular vein appear patent.      Attempts to convey these findings to Dr. Louis were initiated on 9/11/2024 12:16 AM. Findings were conveyed to Dr. Louis on 9/11/2024 12:26 AM.          Assessment and Plan:    Rama Marcelo is a 48 y.o. female transferred from OSH for evaluation of sinus thromboses. MRI brain and MRV on arrival revealed extensive sinus venous thrombosis of the R jugular vein, R sigmoid, transverse, and superior sagittal sinus. In addition, there is cortical vein thrombosis of a L parietal vein with associated venous infarct. Small petechial hemorrhage in stroke bed. Despite petechial hemorrhage anticoagulation is necessary to prevent further venous infarcts and hemorrhage.            Problem list:  - Sinus venous thrombosis   - L Parietal infarct         Plan:  - Neurochecks Q4H  - Target normotension 100-130/60-80  - Continue Heparin gtt, given clot burden will consider transitioning to DOAC this weekend               - Will need DOAC for 6 months   - " Hypercoagulable panel pending   - DC all hormonal medications including OCP  - H.A management per primary team   - Target normoglycemia  while admitted  - PT/OT/SLP  - DVT prophylaxis: Heparin gtt   - Will need follow up MRI brain wwo in 5-6 months   - Follow-up with stroke clinic outpatient      CATHY Ballard  Vascular Neurology    The evaluation of the patient, and recommended management, was discussed with Dr. Cabello, the attending Vascular Neurologist. I have performed a physical exam and reviewed and updated ROS and Plan today (9/13/2024).

## 2024-09-13 NOTE — PROGRESS NOTES
Isolation Precautions:    Patient is on droplet isolation for rhinovirus.    Patient educated on reason for isolation, how the infection may be transmitted, and how to help prevent transmission to others.     Patient educated that droplet/special contact precautions involves staff and visitors wearing PPE, follow  Standard Precautions and perform meticulous hand hygiene in order to prevent transmission of infection. (Contact Precautions: gown and gloves; Special Contact Precautions: gown and gloves, with the added requirement of soap and water for hand hygiene; Droplet Precautions: surgical mask worn by staff and visitors in the room, and worn by the patient when out of the room; Airborne Precautions: involves staff wearing PPE to include an N95 Respirator or Controlled Air Purifying Respirator (CAPR).  Visitors should be limited and may wear an N95 mask).         Patient transport and mobilization on unit. Patient educated that they may leave their room, but prior to exiting, the patient needs to have on a fresh patient gown, ensure the potentially infectious area is covered, perform appropriate hand hygiene immediately prior to exiting the room. (*For Airborne Precautions: Patient educated that time out of the room should be minimized and limited to transport to diagnostic procedures or other activities. Patient is to wear surgical mask when required to leave the patient room).

## 2024-09-13 NOTE — CARE PLAN
The patient is Stable - Low risk of patient condition declining or worsening    Shift Goals  Clinical Goals: therapeutic hep gtt, stable neuro status  Patient Goals: sleep  Family Goals: mary    Progress made toward(s) clinical / shift goals:      Problem: Neuro Status  Goal: Neuro status will remain stable or improve  Outcome: Progressing  Q4H neuro checks in place. Pt's neurological status (A/Ox4) remains unchanged throughout shift. Bed alarm is on, call light within reach.     Problem: Hemodynamics  Goal: Patient's hemodynamics, fluid balance and neurologic status will be stable or improve  Outcome: Progressing       Patient is not progressing towards the following goals: n/a

## 2024-09-13 NOTE — CARE PLAN
The patient is Stable - Low risk of patient condition declining or worsening    Shift Goals  Clinical Goals: therapeutic heparin gtt, monitor neuro status, switch to PO blood thinner tomorrow  Patient Goals: Rest and go home  Family Goals: CHAZ    Progress made toward(s) clinical / shift goals:      Problem: Self Care  Goal: Patient will have the ability to perform ADLs independently or with assistance (bathe, groom, dress, toilet and feed)  Description: Target End Date:  Prior to discharge or change in level of care    Document on ADL flowsheet    1.  Assess the capability and level of deficiency to perform ADLs  2.  Encourage family/care giver involvement  3.  Provide assistive devices  4.  Consider PT/OT evaluations  5.  Maintain support, give positive feedback, encourage self-care allowing extra time and verbal cuing as needed  6.  Avoid doing something for patients they can do themselves, but provide assistance as needed  7.  Assist in anticipating/planning individual needs  8.  Collaborate with Case Management and  to meet discharge needs  Outcome: Progressing  Note: Patient is independent with all ADLs, patient was able to shower today independently. Patient is also independent in her room.      Problem: Pain - Standard  Goal: Alleviation of pain or a reduction in pain to the patient’s comfort goal  Description: Target End Date:  Prior to discharge or change in level of care    Document on Vitals flowsheet    1.  Document pain using the appropriate pain scale per order or unit policy  2.  Educate and implement non-pharmacologic comfort measures (i.e. relaxation, distraction, massage, cold/heat therapy, etc.)  3.  Pain management medications as ordered  4.  Reassess pain after pain med administration per policy  5.  If opiods administered assess patient's response to pain medication is appropriate per POSS sedation scale  6.  Follow pain management plan developed in collaboration with patient and  interdisciplinary team (including palliative care or pain specialists if applicable)  Outcome: Progressing  Note: Upon RN assessment patient complains of 0/10 pain.

## 2024-09-14 ENCOUNTER — PHARMACY VISIT (OUTPATIENT)
Dept: PHARMACY | Facility: MEDICAL CENTER | Age: 49
End: 2024-09-14
Payer: COMMERCIAL

## 2024-09-14 VITALS
TEMPERATURE: 97.4 F | OXYGEN SATURATION: 97 % | WEIGHT: 167.33 LBS | RESPIRATION RATE: 18 BRPM | HEIGHT: 61 IN | BODY MASS INDEX: 31.59 KG/M2 | DIASTOLIC BLOOD PRESSURE: 75 MMHG | SYSTOLIC BLOOD PRESSURE: 125 MMHG | HEART RATE: 61 BPM

## 2024-09-14 LAB
ERYTHROCYTE [DISTWIDTH] IN BLOOD BY AUTOMATED COUNT: 43.9 FL (ref 35.9–50)
HCT VFR BLD AUTO: 39.5 % (ref 37–47)
HGB BLD-MCNC: 13.4 G/DL (ref 12–16)
MCH RBC QN AUTO: 29.5 PG (ref 27–33)
MCHC RBC AUTO-ENTMCNC: 33.9 G/DL (ref 32.2–35.5)
MCV RBC AUTO: 86.8 FL (ref 81.4–97.8)
PLATELET # BLD AUTO: 268 K/UL (ref 164–446)
PMV BLD AUTO: 11.1 FL (ref 9–12.9)
RBC # BLD AUTO: 4.55 M/UL (ref 4.2–5.4)
WBC # BLD AUTO: 7.4 K/UL (ref 4.8–10.8)

## 2024-09-14 PROCEDURE — 700111 HCHG RX REV CODE 636 W/ 250 OVERRIDE (IP): Performed by: STUDENT IN AN ORGANIZED HEALTH CARE EDUCATION/TRAINING PROGRAM

## 2024-09-14 PROCEDURE — RXMED WILLOW AMBULATORY MEDICATION CHARGE: Performed by: STUDENT IN AN ORGANIZED HEALTH CARE EDUCATION/TRAINING PROGRAM

## 2024-09-14 PROCEDURE — A9270 NON-COVERED ITEM OR SERVICE: HCPCS

## 2024-09-14 PROCEDURE — 99239 HOSP IP/OBS DSCHRG MGMT >30: CPT | Performed by: STUDENT IN AN ORGANIZED HEALTH CARE EDUCATION/TRAINING PROGRAM

## 2024-09-14 PROCEDURE — 99232 SBSQ HOSP IP/OBS MODERATE 35: CPT

## 2024-09-14 PROCEDURE — 700102 HCHG RX REV CODE 250 W/ 637 OVERRIDE(OP)

## 2024-09-14 PROCEDURE — 85027 COMPLETE CBC AUTOMATED: CPT

## 2024-09-14 RX ADMIN — APIXABAN 5 MG: 5 TABLET, FILM COATED ORAL at 09:54

## 2024-09-14 RX ADMIN — HEPARIN SODIUM 16 UNITS/KG/HR: 5000 INJECTION, SOLUTION INTRAVENOUS at 05:44

## 2024-09-14 ASSESSMENT — PAIN DESCRIPTION - PAIN TYPE: TYPE: ACUTE PAIN

## 2024-09-14 ASSESSMENT — FIBROSIS 4 INDEX: FIB4 SCORE: 2.74

## 2024-09-14 NOTE — DISCHARGE SUMMARY
Discharge Summary    CHIEF COMPLAINT ON ADMISSION  Chief Complaint   Patient presents with    Headache    Slurred Speech       Reason for Admission  EMS     Admission Date  9/10/2024    CODE STATUS  Full Code    HPI & HOSPITAL COURSE  Ms. Rama Marcelo is a 48 y.o. female who presented 9/10/2024 as a transfer from outside facility with concerns of cavernous sinus thrombosis.      Patient reports having headache progressively worsening over the last 2 weeks.  Reports associated vomiting.  Reports today this morning she had difficulty finding her words.  Denies any fevers or chills.  CT imaging at outside facility showed no stroke but possible cavernous venous thrombosis.    During this hospitalization, neurology Dr. Monreal was consulted.  Notable MRV revealed extensive sinus venous thrombosis of the right jugular vein, right sigmoid, transverse and superior sagittal sinus.  Patient initiated on hypercoagulable panel on heparin drip.  MRI brain demonstrate left parietal venous infarct with petechial hemorrhage however anticoagulation remains treatment for further development of venous infarcts and hemorrhage. She tolerated heparin drip well, transitioned to eliquis on discharge. Her headache and symptoms have improved. She is discharged to home. She is to follow up with PCP and neurology clinic. She is to get repeat MRI brain in 6 months to follow up sinus venous thrombosis. She is advised to stop her oral contraceptive pill at home.      Therefore, she is discharged in fair and stable condition to home with close outpatient follow-up.    The patient met 2-midnight criteria for an inpatient stay at the time of discharge.    Discharge Date  9/14/2024    FOLLOW UP ITEMS POST DISCHARGE  Take medications as prescribed.  Follow up with PCP and neurology.    DISCHARGE DIAGNOSES  Principal Problem:    Dural venous sinus thrombosis (POA: Unknown)  Active Problems:    Expressive aphasia (POA: Yes)    Abnormal  transaminases (POA: Unknown)    Hypokalemia (POA: Unknown)  Resolved Problems:    * No resolved hospital problems. *      FOLLOW UP  Future Appointments   Date Time Provider Department Center   10/18/2024 10:15 AM Kinjal Melchor M.D. [facility] None     [facility]  [address]  [place] [address]  [phone]  Follow up in 2 week(s)      [facility]  A9    Follow up in 1 week(s)        MEDICATIONS ON DISCHARGE     Medication List        START taking these medications        Instructions   Eliquis 5 MG Tabs  Generic drug: apixaban   Take 1 Tablet by mouth 2 times a day. Indications: Thromboembolism secondary to Atrial Fibrillation  Dose: 5 mg            CONTINUE taking these medications        Instructions   acetaminophen 500 MG Tabs  Commonly known as: Tylenol   Take 1,000 mg by mouth every four hours as needed. Indications: Pain  Dose: 1,000 mg     cyanocobalamin 1000 MCG/ML Soln  Commonly known as: Vitamin B-12   Inject 1,000 mcg into the shoulder, thigh, or buttocks every 30 days. [facility]  Dose: 1,000 mcg     ferrous gluconate 324 (38 Fe) MG Tabs  Commonly known as: Fergon   Take 648 mg by mouth every Monday, Wednesday, and Friday.  Dose: 648 mg     Prenatal 27-1 MG Tabs   Take 1 Tablet by mouth every day.  Dose: 1 Tablet     VITAMIN C PO   Take 1 Tablet by mouth every Monday, Wednesday, and Friday.  Dose: 1 Tablet     Vitamin D3 2000 UNIT Caps   Take 1 Capsule by mouth every day.  Dose: 1 Capsule     VITAMINS A C PO   Take 1 Tablet by mouth every day.  Dose: 1 Tablet            STOP taking these medications      Microgestin 24 Fe 1-20 MG-MCG Tabs  Generic drug: Norethin Ace-Eth Estrad-FE     ondansetron 4 MG Tbdp  Commonly known as: Zofran ODT              Allergies  Allergies   Allergen Reactions    Amoxicillin Swelling     Swelling lips, blisters in mouth, hives    Apple Hives     Oral swelling, hives    Aspirin Swelling     TOLD NOT TO TAKE DUE TO NSAID ALLERGY     Eggplant Hives     Blisters in throat    Ibuprofen Hives and Swelling    Shrimp (Diagnostic) Hives and Swelling    Tomato Hives and Swelling    [place] Hives and Swelling     Per pt, she can eat other tree nuts like almonds and also tolerates peanuts    Milk-Related Compounds      GI symptoms       DIET  Orders Placed This Encounter   Procedures    Diet Order Diet: Regular     Standing Status:   Standing     Number of Occurrences:   1     Order Specific Question:   Diet:     Answer:   Regular [1]       ACTIVITY  As tolerated.  Weight bearing as tolerated    CONSULTATIONS  neurology    PROCEDURES  none    LABORATORY  Lab Results   Component Value Date    SODIUM 137 09/12/2024    POTASSIUM 3.8 09/12/2024    CHLORIDE 104 09/12/2024    CO2 21 09/12/2024    GLUCOSE 104 (H) 09/12/2024    BUN 6 (L) 09/12/2024    CREATININE 0.52 09/12/2024        Lab Results   Component Value Date    WBC 7.4 09/14/2024    HEMOGLOBIN 13.4 09/14/2024    HEMATOCRIT 39.5 09/14/2024    PLATELETCT 268 09/14/2024        Total time of the discharge process exceeds 33 minutes.

## 2024-09-14 NOTE — PROGRESS NOTES
Vascular Neurology Progress Note  Vascular Neurology Service, [facility]    Referring Physician: Jey Hernandez M.D.      Interval History: No recurrence of H.A, no new neurological symptoms. Transition to [facility] today, follow up MRI brain wwo in 5-6 months and follow up in stroke clinic.     Review of systems: In addition to what is detailed in the HPI and/or updated in the interval history, all other systems reviewed and are negative.    Past Medical History, Past Surgical History and Social History reviewed and unchanged from prior    Medications:    Current Facility-Administered Medications:     heparin infusion 25,000 units in 500 mL 0.45% NACL, 0-30 Units/kg/hr (Adjusted), Intravenous, Continuous, Johnson Louis M.D., Last Rate: 18.5 mL/hr at 09/14/24 0704, 16 Units/kg/hr at 09/14/24 0704    heparin injection 2,300 Units, 40 Units/kg (Adjusted), Intravenous, PRN, Johnson Louis M.D.    ondansetron (Zofran) syringe/vial injection 4 mg, 4 mg, Intravenous, Q4HRS PRN, Johnson Louis M.D.    ondansetron (Zofran ODT) dispertab 4 mg, 4 mg, Oral, Q4HRS PRN, Johnson Louis M.D.    promethazine (Phenergan) tablet 12.5-25 mg, 12.5-25 mg, Oral, Q4HRS PRN, Johnson Louis M.D.    promethazine (Phenergan) suppository 12.5-25 mg, 12.5-25 mg, Rectal, Q4HRS PRN, Johnson Louis M.D.    prochlorperazine (Compazine) injection 5-10 mg, 5-10 mg, Intravenous, Q4HRS PRN, Johnson Louis M.D.    Pharmacy Consult Request ...Pain Management Review 1 Each, 1 Each, Other, PHARMACY TO DOSE, Johnson Louis M.D.    acetaminophen (Tylenol) tablet 1,000 mg, 1,000 mg, Oral, Q6HRS, 1,000 mg at 09/13/24 0152 **FOLLOWED BY** [START ON 9/16/2024] acetaminophen (Tylenol) tablet 1,000 mg, 1,000 mg, Oral, Q6HRS PRN, Johnson Louis M.D.    oxyCODONE immediate-release (Roxicodone) tablet 5 mg, 5 mg, Oral, Q3HRS PRN **OR** oxyCODONE immediate release (Roxicodone) tablet 10 mg, 10 mg, Oral, Q3HRS PRN **OR** HYDROmorphone  "(Dilaudid) injection 0.5 mg, 0.5 mg, Intravenous, Q3HRS ELBAN, Johnson Louis M.D.    Physical Examination:   /75   Pulse 61   Temp 36.3 °C (97.4 °F) (Temporal)   Resp 18   Ht 1.549 m (5' 1\")   Wt 75.9 kg (167 lb 5.3 oz)   LMP 08/12/2024 (Exact Date) Comment: tubal ligation 1999  SpO2 97%   BMI 31.62 kg/m²       General: Patient is awake and in no acute distress  Eye: Examination of optic disks not indicated at this time given acuity of consult  Neck: There is normal range of motion  Extremities:  clear, dry, intact, without peripheral edema     NEUROLOGICAL EXAM:   Mental status: Awake, alert and fully oriented  Speech and language: Naming and repetition intact, fluent speech, follows simple, two-step, multi-step and complex commands.  Motor exam: There is sustained antigravity with no downward drift in bilateral arms and legs.  There is no pronator drift. Tone is normal. No abnormal movements were seen on exam.     RUE 5/5              LUE 5/5              RLL 5/5              LLL 5/5        Sensory exam: Reacts to tactile in all 4 extremities, no neglect to double stim   Coordination: No ataxia on elicited movements   Gait: Deferred     Objective Data:    Labs:  Estimated Creatinine Clearance: 120.6 mL/min (by C-G formula based on SCr of 0.52 mg/dL).  Recent Labs     09/12/24  0043   SODIUM 137   POTASSIUM 3.8   CHLORIDE 104   CO2 21   GLUCOSE 104*   BUN 6*   CREATININE 0.52     Recent Labs     09/12/24  0043   GLUCOSE 104*     No results for input(s): \"ASTSGOT\", \"ALTSGPT\", \"ALKPHOSPHAT\" in the last 72 hours.    Invalid input(s): \"BILI\"    Recent Labs     09/12/24  0043 09/13/24  0139 09/14/24  0554   WBC 11.1* 6.7 7.4   HEMOGLOBIN 14.3 14.0 13.4   PLATELETCT 269 264 268     Lab Results   Component Value Date/Time    PROTHROMBTM 14.6 09/11/2024 01:23 AM    INR 1.13 09/11/2024 01:23 AM       Latest Reference Range & Units 09/11/24 01:24   Anti-Xa Qualitative Interpretation Not Present  Not Performed "   Anticoagulant Medication Neutralization Not Performed  Not Performed   dRVVT Screen Ratio <=1.20  0.93   dRVVT Confirmation Ratio <=1.20  Not Performed   Neutralized dRVVT Screen Ratio <=1.20  Not Performed   Lupus Anticoag Interp  See Note   dRVVT 1:1 Mix <=1.20  Not Performed   dPT Confirm Ratio <=1.20  1.01   Hexagonal Phospholipid Confirmation <=7.9 s Not Performed   PTT LA <=1.20  Not Performed   Protein C Functional 83 - 168 % 45 (L)   Protein S-Functional 57 - 131 % 72   Antithrombin III Ag Immuno 82 - 136 % 76 (L)   Antithrombin III, Functional 76 - 128 % 93   Thrombin Time <=19.5 s Not Performed   (L): Data is abnormally low     Latest Reference Range & Units 09/11/24 01:24   Anti-Cardiolipin Ab Iga <=11 APL <10   Anti-Cardiolipin Ab Igm <=12 MPL  <=12 MPL <10  <10   Anti-Cariolipin Ab Igg <=14 GPL  <=14 GPL <10  <10   Beta-2 Glycoprotein I Ab Igg <=20 SGU <10   Beta-2 Glycoprotein I Igm <=20 SMU <10   Testing Summary  See Note             Imaging/Testing:    I interpreted and/or reviewed the patient's neuroimaging    MR-BRAIN-W/O   Final Result      1.  Dural sinus thrombosis involving the right internal jugular vein, sigmoid sinus, transverse sinus and sagittal sinus.   2.  Thrombosis of a left posterior cerebral vein overlying the parietal cortex with associated venous infarct.      Attempts to convey these findings to Dr. Louis were initiated on 9/11/2024 12:16 AM. Findings were conveyed to Dr. Louis on 9/11/2024 12:26 AM.      MR-VENOGRAM (MRV) HEAD   Final Result         1.  Occlusive dural venous sinus thrombosis involving the sagittal sinus, right transverse and sigmoid sinuses and proximal right jugular vein. Additionally, there appears to be thrombosis of a left posterior superior cerebral vein overlying the parietal    lobe.   2.  Left transverse and sigmoid sinuses and left jugular vein appear patent.      Attempts to convey these findings to Dr. Louis were initiated on 9/11/2024 12:16 AM.  Findings were conveyed to Dr. Louis on 9/11/2024 12:26 AM.          Assessment and Plan:    Rama Marcelo is a 48 y.o. female transferred from OSH for evaluation of sinus thromboses. MRI brain and MRV on arrival revealed extensive sinus venous thrombosis of the R jugular vein, R sigmoid, transverse, and superior sagittal sinus. In addition, there is cortical vein thrombosis of a L parietal vein with associated venous infarct. Small petechial hemorrhage in stroke bed. Despite petechial hemorrhage anticoagulation is necessary to prevent further venous infarcts and hemorrhage. Transition from Heparin gtt to Eliquis today, will need to continue AC for 6 months. Educated on the importance of medication compliance and need to avoid OCP.            Problem list:  - Sinus venous thrombosis   - L Parietal infarct         Plan:  - Neurochecks Q4H  - Target normotension 100-130/60-80  - DC Heparin gtt, start Eliquis 5 mg PO BID              - Will need DOAC for 6 months   - Hypercoagulable panel grossly unremarkable, slightly decreased Protein C and Antithrombin III- likely r/t heparin gtt   - DC all hormonal medications including OCP  - H.A management per primary team   - Target normoglycemia  while admitted  - PT/OT/SLP  - DVT prophylaxis: DOAC  - Will need follow up MRI brain wwo in 5-6 months   - Follow-up with stroke clinic outpatient        No further recommendations or further studies from an acute neurological standpoint at this time. Please re-consult if you have further questions or there is a change in status.      Savannah Jaimes, CATHY  Vascular Neurology    The evaluation of the patient, and recommended management, was discussed with Dr. Cabello, the attending Vascular Neurologist. I have performed a physical exam and reviewed and updated ROS and Plan today (9/14/2024).

## 2024-09-14 NOTE — PROGRESS NOTES
Pt was brought down to discharge lounge. Pt did not have any questions regarding discharge. Pt's daughter is picking pt up. Pt is awaiting med via meds to beds. Pt states having all belongings

## 2024-09-14 NOTE — PROGRESS NOTES
Monitor Summary: SB/SR/ST  ID 0.15 QRS 0.08 QT 0.44 with I/O JR, PVC, PACs per strip from monitor room.

## 2024-09-14 NOTE — PROGRESS NOTES
Monitor Summary: SB,SR 52-66, ND 0.16, QRS 0.09, QT 0.46, with in and out junctional rhythms and rare PVCs  per strip from monitor room.

## 2024-09-14 NOTE — CARE PLAN
The patient is Stable - Low risk of patient condition declining or worsening    Shift Goals  Clinical Goals: therapeutic heparin gtt, monitor neuro status, switch to PO blood thinner tomorrow  Patient Goals: go home  Family Goals: mary    Progress made toward(s) clinical / shift goals:      Problem: Discharge Barriers/Planning  Goal: Patient's continuum of care needs are met  Outcome: Progressing     Problem: Hemodynamics  Goal: Patient's hemodynamics, fluid balance and neurologic status will be stable or improve  Outcome: Progressing     Problem: Self Care  Goal: Patient will have the ability to perform ADLs independently or with assistance (bathe, groom, dress, toilet and feed)  Outcome: Progressing     Problem: Pain - Standard  Goal: Alleviation of pain or a reduction in pain to the patient’s comfort goal  Outcome: Progressing     Problem: Fall Risk  Goal: Patient will remain free from falls  Outcome: Progressing     Problem: Neuro Status  Goal: Neuro status will remain stable or improve  Outcome: Progressing       Patient is not progressing towards the following goals: n/a

## 2024-09-20 ENCOUNTER — TELEPHONE (OUTPATIENT)
Dept: NEUROLOGY | Facility: MEDICAL CENTER | Age: 49
End: 2024-09-20
Payer: COMMERCIAL

## 2024-11-05 PROBLEM — Z02.1: Status: ACTIVE | Noted: 2024-11-05

## 2025-07-03 ENCOUNTER — OFFICE VISIT (OUTPATIENT)
Facility: MEDICAL CENTER | Age: 50
End: 2025-07-03
Attending: PSYCHIATRY & NEUROLOGY
Payer: COMMERCIAL

## 2025-07-03 VITALS
TEMPERATURE: 97.9 F | DIASTOLIC BLOOD PRESSURE: 60 MMHG | WEIGHT: 171 LBS | RESPIRATION RATE: 16 BRPM | OXYGEN SATURATION: 97 % | HEIGHT: 61 IN | HEART RATE: 61 BPM | SYSTOLIC BLOOD PRESSURE: 108 MMHG | BODY MASS INDEX: 32.28 KG/M2

## 2025-07-03 DIAGNOSIS — Q28.2 CEREBRAL ARTERIOVENOUS MALFORMATION (AVM): ICD-10-CM

## 2025-07-03 DIAGNOSIS — G08 DURAL VENOUS SINUS THROMBOSIS: Primary | ICD-10-CM

## 2025-07-03 PROCEDURE — 99215 OFFICE O/P EST HI 40 MIN: CPT | Performed by: PSYCHIATRY & NEUROLOGY

## 2025-07-03 PROCEDURE — 3078F DIAST BP <80 MM HG: CPT | Performed by: PSYCHIATRY & NEUROLOGY

## 2025-07-03 PROCEDURE — 3074F SYST BP LT 130 MM HG: CPT | Performed by: PSYCHIATRY & NEUROLOGY

## 2025-07-03 PROCEDURE — 99417 PROLNG OP E/M EACH 15 MIN: CPT | Performed by: PSYCHIATRY & NEUROLOGY

## 2025-07-03 ASSESSMENT — ENCOUNTER SYMPTOMS
SENSORY CHANGE: 0
SEIZURES: 0
DIZZINESS: 0
FOCAL WEAKNESS: 0
BLOOD IN STOOL: 0
LOSS OF CONSCIOUSNESS: 0
SPEECH CHANGE: 0
HEADACHES: 1
BRUISES/BLEEDS EASILY: 0
MEMORY LOSS: 0

## 2025-07-03 ASSESSMENT — PATIENT HEALTH QUESTIONNAIRE - PHQ9
SUM OF ALL RESPONSES TO PHQ QUESTIONS 1-9: 5
CLINICAL INTERPRETATION OF PHQ2 SCORE: 2
5. POOR APPETITE OR OVEREATING: 0 - NOT AT ALL

## 2025-07-03 ASSESSMENT — FIBROSIS 4 INDEX: FIB4 SCORE: 2.92

## 2025-07-03 NOTE — PROGRESS NOTES
Subjective     Rama Marcelo is a 49 y.o. female who presents with her daughter, from the office of CATHY Ballard, for consultation, following admission September of last year for extensive dural venous sinus thrombosis, on chronic anticoagulation, but he was also found to have suffered from a dural AVM warranting further follow-up and evaluation.  Issues gotten from review of the records as well as discussions with the patient and her daughter.    ARSLAN Ontiveros is a very pleasant 49-year-old  woman who symptoms started back in the beginning of September, 2024.  She remembers having severe headache begin and remain.  She has a distant history of migraine with aura when she was much younger on birth control.  They stopped when she was off hormones.  The headaches this time around were occipital in location, throbbing in quality, associated with significant heightened sensitivities to everything around her including her skin, concentration was curtailed and there was severe nausea and vomiting.  She treated this symptomatically only for about 1 week and then came to a local hospital for evaluation.    At [facility], her workup revealed nothing of note, she was discharged on medication.  Over the next couple of days the headache continued and she began to slur her speech, confusion ensued, she was having word finding difficulties.  She was brought to [facility] [place], imaging of the brain revealed extensive venous sinus thrombosis and possible hemorrhagic infarct, and she was transferred here.    While here, CTA of the brain on September 10, 2024 using a nonvenous protocol revealed no LVO, with suggestion of extensive venous thrombosis posteriorly on the right involving the transverse, superior sagittal and jugular venous structures as well as a possible left parietal cortical vein thrombosis versus hemorrhage.    Follow-up MRI of the brain revealed  the extensive sinus thrombosis extending from the right internal jugular vein through the sigmoid, transverse and sagittal sinus, as well as thrombosis of a posterior cerebral vein overlying the parietal cortex with associated venous infarct.  The left transverse, sigmoid and jugular veins were patent.    A Prothrombogenic immunochemistry panel with anticardiolipin antibody and beta-2 glycoprotein 1 antigen were all negative.  No additional blood work was drawn.  With admission she was also on estrogen-based birth control to control menorrhagia from uterine fibroids.  She was started on Eliquis, the headaches and the confusion with dysarthria improved and she was discharged for follow-up.  Off the hormonal supplementation, menstrual flow has become quite heavy and painful.    Since her discharge, the headaches have become more episodic, throbbing in quality with mild scalp hyperalgesia only.  The headaches could be triggered by pulling her hair back for extended intervals of time or wearing glasses above her head keeping her hair out of her eyes.  She also has problems with bad dentition, and with this pain, with or without infection, the headaches worsen.  Her menstrual bleeding also has worsened.  Fortunately, she denies any issues with language difficulties, confusion, focal motor or sensory distortions, balance difficulties, visual changes, seizures, etc.    She underwent follow-up MRI scan on June 1, 2025, revealing the left dural vascular malformation with a feeding artery from the left MCA and draining cortical vein.  MRV was not repeated.    Medical history is unremarkable for history of DVT, CVA, PE, miscarriage, hypertension, diabetes, MS, seizure, blood dyscrasia, liver kidney disease, autoimmune disease, or neurodegenerative disease.    There is no surgical history of note from my standpoint.    Her father has passed, her mother is alive, she does not remember either ever suffering from issues with blood  "clots or brain bleeding.  She has 7 half siblings on her mother side, 34 (!) siblings on her father side.  She knows little about most of them.    Her menses are still clouded because of significant menorrhagia.  She was just started on Micronor in the hopes of controlling the blood flow.  Like her teeth extraction, surgery is pending her discontinuing Eliquis.    She quit tobacco use over 15 years ago, there is no history of alcohol use.    She is on Eliquis 5 mg twice daily, prenatal vitamin, Tylenol, vitamin C, vitamin D with calcium, vitamin B12, Fergon 324 and Micronor 0.35 mg tablet daily.    Review of Systems   Constitutional:  Positive for malaise/fatigue.   HENT:  Negative for nosebleeds.    Gastrointestinal:  Negative for blood in stool and melena.   Genitourinary:  Negative for hematuria.   Neurological:  Positive for headaches. Negative for dizziness, sensory change, speech change, focal weakness, seizures and loss of consciousness.   Endo/Heme/Allergies:  Does not bruise/bleed easily.   Psychiatric/Behavioral:  Negative for memory loss.    All other systems reviewed and are negative.    Objective     /60 (BP Location: Left arm, Patient Position: Sitting, BP Cuff Size: Adult)   Pulse 61   Temp 36.6 °C (97.9 °F) (Temporal)   Resp 16   Ht 1.549 m (5' 1\")   Wt 77.6 kg (171 lb)   LMP 05/23/2025   SpO2 97%   BMI 32.31 kg/m²      Physical Exam    She appears in no acute distress.  Her vital signs are stable.  There is no malar rash, jaw or temporal tenderness, jaw claudication, or allodynia.  There is no scalp tenderness, the occipital ridge and notch bilaterally are nontender.  Range of motion of the cervical spine is full, compression maneuvers are negative.  Cardiac evaluation reveals a regular rhythm.  There was no evidence of bruising.     Neurological Exam    Fully oriented, there is no aphasia, agnosia, apraxia, or inattention.    PERRLA/EOMI, visual fields are full, facial movements are " symmetric, sensory exam is intact to temperature and pinprick bilaterally.  The tongue and uvula are midline, jaw jerk is absent.  Jaw movements are intact.  Shoulder shrug and head rotation are normal.    Musculoskeletal exam reveals normal tone, there is no tremor, asterixis, or drift.  Strength is 5/5 bilaterally.  Reflexes are present at all points, there are no asymmetries, none are dropped.  The toes are downgoing bilaterally.    She stands easily, gait is normal and station and stride length, armswing is symmetric.  Heel, toe, and tandem walking are all normal.  There is no appendicular dystaxia.  Fine motor control and repetitive movements are normal bilaterally.    Sensory exams intact to vibration and temperature, Romberg is absent.  Assessment & Plan  Dural venous sinus thrombosis  It would be nice to know if we can discontinue the Eliquis safely, but some additional blood work does need to be drawn to rule out other Prothrombogenic states.  On estrogen-based contraception at the time, this is probably the likely cause, but being off estrogen and allowing menstrual bleeding to ensue, creates its own set of issues.  In any case she is off the estrogen long-term.  Eliquis will be continued, follow-up MRV also to be done to see the status of the venous sinus thrombosis.  If possible, Eliquis being discontinued, it would allow her to pursue both dental extraction as well as ANUM for her menorrhagia.  On the other hand if Eliquis needs to be continued lifelong, Lovenox can then be used as a bridging treatment to allow her to follow through with these procedures.  We will contact her with the results as they become available.    Orders:    MR-VENOGRAM (MRV) HEAD; Future    IR-NEURO INTERVENTIONAL CONSULT-OP; Future    Cerebral arteriovenous malformation (AVM)  Referral to neurointervention will be made to allow for better assessment of the underlying vascular anomaly and as to whether or not it is amenable to  surgery versus coiling.    Orders:    IR-NEURO INTERVENTIONAL CONSULT-OP; Future    Time: 60 minutes in total spent on patient care including.  From charting, record review, discussion with healthcare staff and documentation.  This includes face-to-face time for exam, review, discussion, as well as counseling and coordinating care.

## 2025-07-03 NOTE — ASSESSMENT & PLAN NOTE
It would be nice to know if we can discontinue the Eliquis safely, but some additional blood work does need to be drawn to rule out other Prothrombogenic states.  On estrogen-based contraception at the time, this is probably the likely cause, but being off estrogen and allowing menstrual bleeding to ensue, creates its own set of issues.  In any case she is off the estrogen long-term.  Eliquis will be continued, follow-up MRV also to be done to see the status of the venous sinus thrombosis.  If possible, Eliquis being discontinued, it would allow her to pursue both dental extraction as well as ANUM for her menorrhagia.  On the other hand if Eliquis needs to be continued lifelong, Lovenox can then be used as a bridging treatment to allow her to follow through with these procedures.  We will contact her with the results as they become available.    Orders:    MR-VENOGRAM (MRV) HEAD; Future    IR-NEURO INTERVENTIONAL CONSULT-OP; Future

## 2025-08-04 ENCOUNTER — APPOINTMENT (OUTPATIENT)
Dept: ADMISSIONS | Facility: MEDICAL CENTER | Age: 50
End: 2025-08-04
Attending: RADIOLOGY
Payer: COMMERCIAL

## 2025-08-04 ENCOUNTER — HOSPITAL ENCOUNTER (OUTPATIENT)
Dept: RADIOLOGY | Facility: MEDICAL CENTER | Age: 50
End: 2025-08-04
Attending: PSYCHIATRY & NEUROLOGY
Payer: COMMERCIAL

## 2025-08-04 DIAGNOSIS — Q28.2 CEREBRAL ARTERIOVENOUS MALFORMATION (AVM): ICD-10-CM

## 2025-08-04 DIAGNOSIS — G08 DURAL VENOUS SINUS THROMBOSIS: ICD-10-CM

## 2025-08-04 ASSESSMENT — ENCOUNTER SYMPTOMS
NECK PAIN: 1
HEADACHES: 1

## 2025-08-06 ENCOUNTER — PRE-ADMISSION TESTING (OUTPATIENT)
Dept: ADMISSIONS | Facility: MEDICAL CENTER | Age: 50
End: 2025-08-06
Attending: RADIOLOGY
Payer: COMMERCIAL

## 2025-08-06 RX ORDER — TOPIRAMATE 25 MG/1
50 TABLET, FILM COATED ORAL EVERY EVENING
COMMUNITY
Start: 2025-07-30

## 2025-08-10 ENCOUNTER — APPOINTMENT (OUTPATIENT)
Dept: RADIOLOGY | Facility: MEDICAL CENTER | Age: 50
End: 2025-08-10
Attending: PSYCHIATRY & NEUROLOGY
Payer: COMMERCIAL

## 2025-08-11 ENCOUNTER — APPOINTMENT (OUTPATIENT)
Dept: RADIOLOGY | Facility: MEDICAL CENTER | Age: 50
End: 2025-08-11
Attending: RADIOLOGY
Payer: COMMERCIAL

## 2025-08-11 ENCOUNTER — HOSPITAL ENCOUNTER (OUTPATIENT)
Facility: MEDICAL CENTER | Age: 50
End: 2025-08-11
Attending: RADIOLOGY | Admitting: RADIOLOGY
Payer: COMMERCIAL

## 2025-08-11 VITALS
HEART RATE: 59 BPM | DIASTOLIC BLOOD PRESSURE: 62 MMHG | HEIGHT: 61 IN | TEMPERATURE: 97.1 F | SYSTOLIC BLOOD PRESSURE: 119 MMHG | OXYGEN SATURATION: 97 % | RESPIRATION RATE: 20 BRPM | WEIGHT: 175.71 LBS | BODY MASS INDEX: 33.17 KG/M2

## 2025-08-11 DIAGNOSIS — Q28.2 AVM (ARTERIOVENOUS MALFORMATION) BRAIN: ICD-10-CM

## 2025-08-11 LAB
ERYTHROCYTE [DISTWIDTH] IN BLOOD BY AUTOMATED COUNT: 51.2 FL (ref 35.9–50)
HCG UR QL: NEGATIVE
HCT VFR BLD AUTO: 35.4 % (ref 37–47)
HGB BLD-MCNC: 11.5 G/DL (ref 12–16)
INR PPP: 1.24 (ref 0.87–1.13)
MCH RBC QN AUTO: 26.7 PG (ref 27–33)
MCHC RBC AUTO-ENTMCNC: 32.5 G/DL (ref 32.2–35.5)
MCV RBC AUTO: 82.3 FL (ref 81.4–97.8)
PLATELET # BLD AUTO: 320 K/UL (ref 164–446)
PMV BLD AUTO: 9.8 FL (ref 9–12.9)
PROTHROMBIN TIME: 15.7 SEC (ref 12–14.6)
RBC # BLD AUTO: 4.3 M/UL (ref 4.2–5.4)
WBC # BLD AUTO: 7.8 K/UL (ref 4.8–10.8)

## 2025-08-11 PROCEDURE — 36227 PLACE CATH XTRNL CAROTID: CPT

## 2025-08-11 PROCEDURE — 160193 HCHG PACU STANDARD - 1ST 60 MINS

## 2025-08-11 PROCEDURE — 85610 PROTHROMBIN TIME: CPT

## 2025-08-11 PROCEDURE — 36224 PLACE CATH CAROTD ART: CPT

## 2025-08-11 PROCEDURE — 85027 COMPLETE CBC AUTOMATED: CPT

## 2025-08-11 PROCEDURE — 36415 COLL VENOUS BLD VENIPUNCTURE: CPT

## 2025-08-11 PROCEDURE — 700111 HCHG RX REV CODE 636 W/ 250 OVERRIDE (IP): Mod: JZ

## 2025-08-11 PROCEDURE — 700105 HCHG RX REV CODE 258: Performed by: RADIOLOGY

## 2025-08-11 PROCEDURE — 99153 MOD SED SAME PHYS/QHP EA: CPT

## 2025-08-11 PROCEDURE — A9270 NON-COVERED ITEM OR SERVICE: HCPCS

## 2025-08-11 PROCEDURE — 700102 HCHG RX REV CODE 250 W/ 637 OVERRIDE(OP)

## 2025-08-11 PROCEDURE — 160046 HCHG PACU - 1ST 60 MINS PHASE II

## 2025-08-11 PROCEDURE — 700111 HCHG RX REV CODE 636 W/ 250 OVERRIDE (IP): Performed by: RADIOLOGY

## 2025-08-11 PROCEDURE — 700117 HCHG RX CONTRAST REV CODE 255: Performed by: RADIOLOGY

## 2025-08-11 PROCEDURE — 36226 PLACE CATH VERTEBRAL ART: CPT

## 2025-08-11 PROCEDURE — 700102 HCHG RX REV CODE 250 W/ 637 OVERRIDE(OP): Performed by: RADIOLOGY

## 2025-08-11 PROCEDURE — 81025 URINE PREGNANCY TEST: CPT

## 2025-08-11 PROCEDURE — 160194 HCHG PACU STANDARD - EA ADDL 30 MINS

## 2025-08-11 PROCEDURE — 160002 HCHG RECOVERY MINUTES (STAT)

## 2025-08-11 PROCEDURE — A9270 NON-COVERED ITEM OR SERVICE: HCPCS | Performed by: RADIOLOGY

## 2025-08-11 RX ORDER — VERAPAMIL HYDROCHLORIDE 2.5 MG/ML
INJECTION INTRAVENOUS
Status: DISCONTINUED
Start: 2025-08-11 | End: 2025-08-11 | Stop reason: HOSPADM

## 2025-08-11 RX ORDER — SODIUM CHLORIDE 9 MG/ML
INJECTION, SOLUTION INTRAVENOUS ONCE
Status: COMPLETED | OUTPATIENT
Start: 2025-08-11 | End: 2025-08-11

## 2025-08-11 RX ORDER — HYDRALAZINE HYDROCHLORIDE 20 MG/ML
20 INJECTION INTRAMUSCULAR; INTRAVENOUS EVERY 6 HOURS PRN
Status: DISCONTINUED | OUTPATIENT
Start: 2025-08-11 | End: 2025-08-11 | Stop reason: HOSPADM

## 2025-08-11 RX ORDER — HEPARIN SODIUM 1000 [USP'U]/ML
INJECTION, SOLUTION INTRAVENOUS; SUBCUTANEOUS
Status: DISCONTINUED
Start: 2025-08-11 | End: 2025-08-11 | Stop reason: HOSPADM

## 2025-08-11 RX ORDER — MIDAZOLAM HYDROCHLORIDE 1 MG/ML
.5-2 INJECTION INTRAMUSCULAR; INTRAVENOUS PRN
Status: ACTIVE | OUTPATIENT
Start: 2025-08-11 | End: 2025-08-11

## 2025-08-11 RX ORDER — VERAPAMIL HYDROCHLORIDE 2.5 MG/ML
INJECTION INTRAVENOUS
Status: COMPLETED | OUTPATIENT
Start: 2025-08-11 | End: 2025-08-11

## 2025-08-11 RX ORDER — NITROGLYCERIN 40 MG/1
PATCH TRANSDERMAL
Status: COMPLETED | OUTPATIENT
Start: 2025-08-11 | End: 2025-08-11

## 2025-08-11 RX ORDER — MIDAZOLAM HYDROCHLORIDE 1 MG/ML
INJECTION INTRAMUSCULAR; INTRAVENOUS
Status: COMPLETED
Start: 2025-08-11 | End: 2025-08-11

## 2025-08-11 RX ORDER — AZITHROMYCIN 250 MG/1
250-500 TABLET, FILM COATED ORAL SEE ADMIN INSTRUCTIONS
COMMUNITY

## 2025-08-11 RX ORDER — NITROGLYCERIN 40 MG/1
PATCH TRANSDERMAL
Status: DISCONTINUED
Start: 2025-08-11 | End: 2025-08-11 | Stop reason: HOSPADM

## 2025-08-11 RX ORDER — SODIUM CHLORIDE 9 MG/ML
500 INJECTION, SOLUTION INTRAVENOUS
Status: ACTIVE | OUTPATIENT
Start: 2025-08-11 | End: 2025-08-11

## 2025-08-11 RX ORDER — HEPARIN SODIUM 1000 [USP'U]/ML
INJECTION, SOLUTION INTRAVENOUS; SUBCUTANEOUS
Status: COMPLETED | OUTPATIENT
Start: 2025-08-11 | End: 2025-08-11

## 2025-08-11 RX ADMIN — MIDAZOLAM HYDROCHLORIDE 1 MG: 1 INJECTION, SOLUTION INTRAMUSCULAR; INTRAVENOUS at 14:32

## 2025-08-11 RX ADMIN — SODIUM CHLORIDE: 9 INJECTION, SOLUTION INTRAVENOUS at 13:15

## 2025-08-11 RX ADMIN — FENTANYL CITRATE 50 MCG: 50 INJECTION, SOLUTION INTRAMUSCULAR; INTRAVENOUS at 14:32

## 2025-08-11 RX ADMIN — FENTANYL CITRATE 25 MCG: 50 INJECTION, SOLUTION INTRAMUSCULAR; INTRAVENOUS at 15:12

## 2025-08-11 RX ADMIN — VERAPAMIL HYDROCHLORIDE 1.25 MG: 2.5 INJECTION, SOLUTION INTRAVENOUS at 15:12

## 2025-08-11 RX ADMIN — IOHEXOL 85 ML: 300 INJECTION, SOLUTION INTRAVENOUS at 15:45

## 2025-08-11 RX ADMIN — HEPARIN SODIUM 1000 UNITS: 1000 INJECTION, SOLUTION INTRAVENOUS; SUBCUTANEOUS at 15:12

## 2025-08-11 RX ADMIN — NITROGLYCERIN 1 PATCH: 0.2 PATCH TRANSDERMAL at 14:53

## 2025-08-11 RX ADMIN — NITROGLYCERIN 100 MCG: 5 INJECTION, SOLUTION INTRAVENOUS at 15:12

## 2025-08-11 RX ADMIN — MIDAZOLAM HYDROCHLORIDE 1 MG: 1 INJECTION INTRAMUSCULAR; INTRAVENOUS at 14:32

## 2025-08-11 ASSESSMENT — PAIN DESCRIPTION - PAIN TYPE: TYPE: SURGICAL PAIN

## 2025-08-11 ASSESSMENT — FIBROSIS 4 INDEX: FIB4 SCORE: 2.92

## 2025-08-19 DIAGNOSIS — Q28.2 CEREBRAL ARTERIOVENOUS MALFORMATION (AVM): Primary | ICD-10-CM
